# Patient Record
Sex: MALE | Race: WHITE | NOT HISPANIC OR LATINO | Employment: FULL TIME | URBAN - METROPOLITAN AREA
[De-identification: names, ages, dates, MRNs, and addresses within clinical notes are randomized per-mention and may not be internally consistent; named-entity substitution may affect disease eponyms.]

---

## 2017-03-08 ENCOUNTER — OFFICE VISIT - HEALTHEAST (OUTPATIENT)
Dept: FAMILY MEDICINE | Facility: CLINIC | Age: 47
End: 2017-03-08

## 2017-03-08 DIAGNOSIS — Z00.00 ROUTINE GENERAL MEDICAL EXAMINATION AT A HEALTH CARE FACILITY: ICD-10-CM

## 2017-03-08 DIAGNOSIS — H93.13 TINNITUS, BILATERAL: ICD-10-CM

## 2017-03-08 LAB
CHOLEST SERPL-MCNC: 205 MG/DL
FASTING STATUS PATIENT QL REPORTED: YES
HDLC SERPL-MCNC: 57 MG/DL
LDLC SERPL CALC-MCNC: 132 MG/DL
PSA SERPL-MCNC: 1.3 NG/ML (ref 0–2.5)
TRIGL SERPL-MCNC: 82 MG/DL

## 2017-03-08 ASSESSMENT — MIFFLIN-ST. JEOR: SCORE: 1828.12

## 2017-05-01 ENCOUNTER — OFFICE VISIT - HEALTHEAST (OUTPATIENT)
Dept: FAMILY MEDICINE | Facility: CLINIC | Age: 47
End: 2017-05-01

## 2017-05-01 DIAGNOSIS — G56.92 NEUROPATHY OF FINGER OF LEFT HAND: ICD-10-CM

## 2017-05-01 DIAGNOSIS — H91.93 HEARING DISORDER, BILATERAL: ICD-10-CM

## 2017-05-13 ENCOUNTER — COMMUNICATION - HEALTHEAST (OUTPATIENT)
Dept: FAMILY MEDICINE | Facility: CLINIC | Age: 47
End: 2017-05-13

## 2017-05-15 ENCOUNTER — AMBULATORY - HEALTHEAST (OUTPATIENT)
Dept: FAMILY MEDICINE | Facility: CLINIC | Age: 47
End: 2017-05-15

## 2017-05-15 DIAGNOSIS — N50.811 TESTICULAR PAIN, RIGHT: ICD-10-CM

## 2017-05-15 DIAGNOSIS — H93.13 TINNITUS, BILATERAL: ICD-10-CM

## 2017-05-24 ENCOUNTER — COMMUNICATION - HEALTHEAST (OUTPATIENT)
Dept: FAMILY MEDICINE | Facility: CLINIC | Age: 47
End: 2017-05-24

## 2017-05-24 ENCOUNTER — HOSPITAL ENCOUNTER (OUTPATIENT)
Dept: ULTRASOUND IMAGING | Facility: CLINIC | Age: 47
Discharge: HOME | End: 2017-05-24
Attending: FAMILY MEDICINE

## 2017-05-24 DIAGNOSIS — N50.811 TESTICULAR PAIN, RIGHT: ICD-10-CM

## 2017-06-13 ENCOUNTER — OFFICE VISIT - HEALTHEAST (OUTPATIENT)
Dept: AUDIOLOGY | Facility: CLINIC | Age: 47
End: 2017-06-13

## 2017-06-13 DIAGNOSIS — H93.13 TINNITUS, BILATERAL: ICD-10-CM

## 2017-08-08 ENCOUNTER — AMBULATORY - HEALTHEAST (OUTPATIENT)
Dept: FAMILY MEDICINE | Facility: CLINIC | Age: 47
End: 2017-08-08

## 2017-08-08 DIAGNOSIS — M25.561 ARTHRALGIA OF RIGHT KNEE: ICD-10-CM

## 2017-08-08 DIAGNOSIS — M77.11 LATERAL EPICONDYLITIS OF RIGHT ELBOW: ICD-10-CM

## 2017-08-09 ENCOUNTER — OFFICE VISIT - HEALTHEAST (OUTPATIENT)
Dept: PHYSICAL THERAPY | Facility: REHABILITATION | Age: 47
End: 2017-08-09

## 2017-08-09 DIAGNOSIS — M25.561 ACUTE PAIN OF RIGHT KNEE: ICD-10-CM

## 2017-08-09 DIAGNOSIS — M77.01 MEDIAL EPICONDYLITIS, RIGHT: ICD-10-CM

## 2017-08-14 ENCOUNTER — OFFICE VISIT - HEALTHEAST (OUTPATIENT)
Dept: PHYSICAL THERAPY | Facility: REHABILITATION | Age: 47
End: 2017-08-14

## 2017-08-14 DIAGNOSIS — M77.01 MEDIAL EPICONDYLITIS, RIGHT: ICD-10-CM

## 2017-08-14 DIAGNOSIS — M25.561 ACUTE PAIN OF RIGHT KNEE: ICD-10-CM

## 2017-08-21 ENCOUNTER — OFFICE VISIT - HEALTHEAST (OUTPATIENT)
Dept: PHYSICAL THERAPY | Facility: REHABILITATION | Age: 47
End: 2017-08-21

## 2017-08-21 DIAGNOSIS — M77.01 MEDIAL EPICONDYLITIS, RIGHT: ICD-10-CM

## 2017-08-21 DIAGNOSIS — M25.561 ACUTE PAIN OF RIGHT KNEE: ICD-10-CM

## 2017-08-24 ENCOUNTER — OFFICE VISIT - HEALTHEAST (OUTPATIENT)
Dept: PHYSICAL THERAPY | Facility: REHABILITATION | Age: 47
End: 2017-08-24

## 2017-08-24 DIAGNOSIS — M77.01 MEDIAL EPICONDYLITIS, RIGHT: ICD-10-CM

## 2017-08-24 DIAGNOSIS — M25.561 ACUTE PAIN OF RIGHT KNEE: ICD-10-CM

## 2017-08-29 ENCOUNTER — OFFICE VISIT - HEALTHEAST (OUTPATIENT)
Dept: PHYSICAL THERAPY | Facility: REHABILITATION | Age: 47
End: 2017-08-29

## 2017-08-29 DIAGNOSIS — M25.561 ACUTE PAIN OF RIGHT KNEE: ICD-10-CM

## 2017-08-29 DIAGNOSIS — M77.01 MEDIAL EPICONDYLITIS, RIGHT: ICD-10-CM

## 2017-08-31 ENCOUNTER — OFFICE VISIT - HEALTHEAST (OUTPATIENT)
Dept: PHYSICAL THERAPY | Facility: REHABILITATION | Age: 47
End: 2017-08-31

## 2017-08-31 DIAGNOSIS — M77.01 MEDIAL EPICONDYLITIS, RIGHT: ICD-10-CM

## 2017-08-31 DIAGNOSIS — M25.561 ACUTE PAIN OF RIGHT KNEE: ICD-10-CM

## 2017-09-05 ENCOUNTER — OFFICE VISIT - HEALTHEAST (OUTPATIENT)
Dept: PHYSICAL THERAPY | Facility: REHABILITATION | Age: 47
End: 2017-09-05

## 2017-09-05 DIAGNOSIS — M25.561 ACUTE PAIN OF RIGHT KNEE: ICD-10-CM

## 2017-09-05 DIAGNOSIS — M77.01 MEDIAL EPICONDYLITIS, RIGHT: ICD-10-CM

## 2017-09-22 ENCOUNTER — COMMUNICATION - HEALTHEAST (OUTPATIENT)
Dept: FAMILY MEDICINE | Facility: CLINIC | Age: 47
End: 2017-09-22

## 2017-09-22 ENCOUNTER — RECORDS - HEALTHEAST (OUTPATIENT)
Dept: ADMINISTRATIVE | Facility: OTHER | Age: 47
End: 2017-09-22

## 2017-09-22 DIAGNOSIS — T15.90XA FOREIGN BODY, EYE: ICD-10-CM

## 2017-09-27 ENCOUNTER — COMMUNICATION - HEALTHEAST (OUTPATIENT)
Dept: FAMILY MEDICINE | Facility: CLINIC | Age: 47
End: 2017-09-27

## 2017-09-28 ENCOUNTER — OFFICE VISIT - HEALTHEAST (OUTPATIENT)
Dept: PHYSICAL THERAPY | Facility: REHABILITATION | Age: 47
End: 2017-09-28

## 2017-09-28 ENCOUNTER — RECORDS - HEALTHEAST (OUTPATIENT)
Dept: ADMINISTRATIVE | Facility: OTHER | Age: 47
End: 2017-09-28

## 2017-09-28 DIAGNOSIS — M25.561 ACUTE PAIN OF RIGHT KNEE: ICD-10-CM

## 2017-09-28 DIAGNOSIS — M77.01 MEDIAL EPICONDYLITIS, RIGHT: ICD-10-CM

## 2017-09-29 ENCOUNTER — AMBULATORY - HEALTHEAST (OUTPATIENT)
Dept: NURSING | Facility: CLINIC | Age: 47
End: 2017-09-29

## 2017-10-23 ENCOUNTER — OFFICE VISIT - HEALTHEAST (OUTPATIENT)
Dept: FAMILY MEDICINE | Facility: CLINIC | Age: 47
End: 2017-10-23

## 2017-10-23 DIAGNOSIS — R00.2 HEART PALPITATIONS: ICD-10-CM

## 2017-10-23 LAB
ATRIAL RATE - MUSE: 67 BPM
DIASTOLIC BLOOD PRESSURE - MUSE: NORMAL MMHG
INTERPRETATION ECG - MUSE: NORMAL
P AXIS - MUSE: 69 DEGREES
PR INTERVAL - MUSE: 162 MS
QRS DURATION - MUSE: 84 MS
QT - MUSE: 354 MS
QTC - MUSE: 374 MS
R AXIS - MUSE: 75 DEGREES
SYSTOLIC BLOOD PRESSURE - MUSE: NORMAL MMHG
T AXIS - MUSE: 68 DEGREES
VENTRICULAR RATE- MUSE: 67 BPM

## 2017-10-24 ENCOUNTER — COMMUNICATION - HEALTHEAST (OUTPATIENT)
Dept: FAMILY MEDICINE | Facility: CLINIC | Age: 47
End: 2017-10-24

## 2017-10-24 DIAGNOSIS — Z80.0 FAMILY HISTORY OF COLON CANCER: ICD-10-CM

## 2017-11-07 ENCOUNTER — RECORDS - HEALTHEAST (OUTPATIENT)
Dept: ADMINISTRATIVE | Facility: OTHER | Age: 47
End: 2017-11-07

## 2017-12-16 ENCOUNTER — COMMUNICATION - HEALTHEAST (OUTPATIENT)
Dept: SCHEDULING | Facility: CLINIC | Age: 47
End: 2017-12-16

## 2018-02-12 ENCOUNTER — TELEPHONE (OUTPATIENT)
Dept: OTHER | Facility: CLINIC | Age: 48
End: 2018-02-12

## 2018-05-16 ENCOUNTER — OFFICE VISIT - HEALTHEAST (OUTPATIENT)
Dept: FAMILY MEDICINE | Facility: CLINIC | Age: 48
End: 2018-05-16

## 2018-05-16 DIAGNOSIS — Z00.00 ROUTINE GENERAL MEDICAL EXAMINATION AT A HEALTH CARE FACILITY: ICD-10-CM

## 2018-05-16 LAB
ALBUMIN SERPL-MCNC: 4 G/DL (ref 3.5–5)
ALP SERPL-CCNC: 55 U/L (ref 45–120)
ALT SERPL W P-5'-P-CCNC: 51 U/L (ref 0–45)
ANION GAP SERPL CALCULATED.3IONS-SCNC: 9 MMOL/L (ref 5–18)
AST SERPL W P-5'-P-CCNC: 34 U/L (ref 0–40)
BILIRUB SERPL-MCNC: 1.1 MG/DL (ref 0–1)
BUN SERPL-MCNC: 11 MG/DL (ref 8–22)
CALCIUM SERPL-MCNC: 9.3 MG/DL (ref 8.5–10.5)
CHLORIDE BLD-SCNC: 106 MMOL/L (ref 98–107)
CHOLEST SERPL-MCNC: 194 MG/DL
CO2 SERPL-SCNC: 25 MMOL/L (ref 22–31)
CREAT SERPL-MCNC: 0.91 MG/DL (ref 0.7–1.3)
FASTING STATUS PATIENT QL REPORTED: YES
GFR SERPL CREATININE-BSD FRML MDRD: >60 ML/MIN/1.73M2
GLUCOSE BLD-MCNC: 100 MG/DL (ref 70–125)
HDLC SERPL-MCNC: 53 MG/DL
LDLC SERPL CALC-MCNC: 128 MG/DL
POTASSIUM BLD-SCNC: 4.3 MMOL/L (ref 3.5–5)
PROT SERPL-MCNC: 7.5 G/DL (ref 6–8)
PSA SERPL-MCNC: 1.1 NG/ML (ref 0–2.5)
SODIUM SERPL-SCNC: 140 MMOL/L (ref 136–145)
TRIGL SERPL-MCNC: 64 MG/DL

## 2018-05-16 ASSESSMENT — MIFFLIN-ST. JEOR: SCORE: 1806.23

## 2018-05-17 ENCOUNTER — COMMUNICATION - HEALTHEAST (OUTPATIENT)
Dept: FAMILY MEDICINE | Facility: CLINIC | Age: 48
End: 2018-05-17

## 2018-05-21 ENCOUNTER — COMMUNICATION - HEALTHEAST (OUTPATIENT)
Dept: FAMILY MEDICINE | Facility: CLINIC | Age: 48
End: 2018-05-21

## 2018-05-22 ENCOUNTER — COMMUNICATION - HEALTHEAST (OUTPATIENT)
Dept: FAMILY MEDICINE | Facility: CLINIC | Age: 48
End: 2018-05-22

## 2019-02-18 ENCOUNTER — COMMUNICATION - HEALTHEAST (OUTPATIENT)
Dept: SCHEDULING | Facility: CLINIC | Age: 49
End: 2019-02-18

## 2019-02-18 DIAGNOSIS — H04.123 DRY EYES: ICD-10-CM

## 2019-02-20 ENCOUNTER — COMMUNICATION - HEALTHEAST (OUTPATIENT)
Dept: ADMINISTRATIVE | Facility: CLINIC | Age: 49
End: 2019-02-20

## 2019-02-20 DIAGNOSIS — H04.123 DRY EYES: ICD-10-CM

## 2019-03-27 ENCOUNTER — OFFICE VISIT - HEALTHEAST (OUTPATIENT)
Dept: FAMILY MEDICINE | Facility: CLINIC | Age: 49
End: 2019-03-27

## 2019-03-27 DIAGNOSIS — M67.471 GANGLION CYST OF RIGHT FOOT: ICD-10-CM

## 2019-03-27 DIAGNOSIS — R00.2 HEART PALPITATIONS: ICD-10-CM

## 2019-03-27 RX ORDER — CARBOXYMETHYLCELLULOSE SODIUM 5 MG/ML
SOLUTION/ DROPS OPHTHALMIC
Status: SHIPPED | COMMUNITY
Start: 2019-03-27

## 2019-03-29 ENCOUNTER — HOSPITAL ENCOUNTER (OUTPATIENT)
Dept: CARDIOLOGY | Facility: CLINIC | Age: 49
Discharge: HOME | End: 2019-03-29
Attending: FAMILY MEDICINE

## 2019-03-29 DIAGNOSIS — R00.2 HEART PALPITATIONS: ICD-10-CM

## 2019-04-01 ENCOUNTER — COMMUNICATION - HEALTHEAST (OUTPATIENT)
Dept: FAMILY MEDICINE | Facility: CLINIC | Age: 49
End: 2019-04-01

## 2019-04-08 ENCOUNTER — COMMUNICATION - HEALTHEAST (OUTPATIENT)
Dept: FAMILY MEDICINE | Facility: CLINIC | Age: 49
End: 2019-04-08

## 2019-06-03 ENCOUNTER — RECORDS - HEALTHEAST (OUTPATIENT)
Dept: LAB | Facility: CLINIC | Age: 49
End: 2019-06-03

## 2019-06-03 LAB
CHOLEST SERPL-MCNC: 217 MG/DL
FASTING STATUS PATIENT QL REPORTED: ABNORMAL
HDLC SERPL-MCNC: 63 MG/DL
LDLC SERPL CALC-MCNC: 140 MG/DL
TRIGL SERPL-MCNC: 69 MG/DL

## 2019-11-12 ENCOUNTER — AMBULATORY - HEALTHEAST (OUTPATIENT)
Dept: SCHEDULING | Facility: CLINIC | Age: 49
End: 2019-11-12

## 2019-11-12 DIAGNOSIS — E78.5 HYPERLIPIDEMIA LDL GOAL <100: ICD-10-CM

## 2019-12-03 ENCOUNTER — HOSPITAL ENCOUNTER (OUTPATIENT)
Dept: NUTRITION | Facility: CLINIC | Age: 49
Discharge: HOME | End: 2019-12-03

## 2019-12-03 DIAGNOSIS — E78.5 HYPERLIPIDEMIA LDL GOAL <100: ICD-10-CM

## 2020-08-03 ENCOUNTER — OFFICE VISIT - HEALTHEAST (OUTPATIENT)
Dept: FAMILY MEDICINE | Facility: CLINIC | Age: 50
End: 2020-08-03

## 2020-08-03 ENCOUNTER — COMMUNICATION - HEALTHEAST (OUTPATIENT)
Dept: FAMILY MEDICINE | Facility: CLINIC | Age: 50
End: 2020-08-03

## 2020-08-03 DIAGNOSIS — F41.1 GENERALIZED ANXIETY DISORDER: ICD-10-CM

## 2020-08-03 DIAGNOSIS — Z13.1 SCREENING FOR DIABETES MELLITUS: ICD-10-CM

## 2020-08-03 DIAGNOSIS — Z00.00 ROUTINE MEDICAL EXAM: ICD-10-CM

## 2020-08-03 DIAGNOSIS — E78.5 HYPERLIPIDEMIA, UNSPECIFIED HYPERLIPIDEMIA TYPE: ICD-10-CM

## 2020-08-03 LAB
ANION GAP SERPL CALCULATED.3IONS-SCNC: 10 MMOL/L (ref 5–18)
BUN SERPL-MCNC: 14 MG/DL (ref 8–22)
CALCIUM SERPL-MCNC: 9.8 MG/DL (ref 8.5–10.5)
CHLORIDE BLD-SCNC: 102 MMOL/L (ref 98–107)
CHOLEST SERPL-MCNC: 211 MG/DL
CO2 SERPL-SCNC: 26 MMOL/L (ref 22–31)
CREAT SERPL-MCNC: 0.99 MG/DL (ref 0.7–1.3)
FASTING STATUS PATIENT QL REPORTED: YES
GFR SERPL CREATININE-BSD FRML MDRD: >60 ML/MIN/1.73M2
GLUCOSE BLD-MCNC: 107 MG/DL (ref 70–125)
HDLC SERPL-MCNC: 58 MG/DL
LDLC SERPL CALC-MCNC: 136 MG/DL
POTASSIUM BLD-SCNC: 4.5 MMOL/L (ref 3.5–5)
SODIUM SERPL-SCNC: 138 MMOL/L (ref 136–145)
TRIGL SERPL-MCNC: 83 MG/DL

## 2020-08-03 ASSESSMENT — ANXIETY QUESTIONNAIRES
1. FEELING NERVOUS, ANXIOUS, OR ON EDGE: NOT AT ALL
5. BEING SO RESTLESS THAT IT IS HARD TO SIT STILL: NOT AT ALL
4. TROUBLE RELAXING: NOT AT ALL
IF YOU CHECKED OFF ANY PROBLEMS ON THIS QUESTIONNAIRE, HOW DIFFICULT HAVE THESE PROBLEMS MADE IT FOR YOU TO DO YOUR WORK, TAKE CARE OF THINGS AT HOME, OR GET ALONG WITH OTHER PEOPLE: NOT DIFFICULT AT ALL
2. NOT BEING ABLE TO STOP OR CONTROL WORRYING: NOT AT ALL
6. BECOMING EASILY ANNOYED OR IRRITABLE: NOT AT ALL
GAD7 TOTAL SCORE: 1
3. WORRYING TOO MUCH ABOUT DIFFERENT THINGS: NOT AT ALL
7. FEELING AFRAID AS IF SOMETHING AWFUL MIGHT HAPPEN: SEVERAL DAYS

## 2020-08-03 ASSESSMENT — MIFFLIN-ST. JEOR: SCORE: 1796.59

## 2020-08-03 ASSESSMENT — PATIENT HEALTH QUESTIONNAIRE - PHQ9: SUM OF ALL RESPONSES TO PHQ QUESTIONS 1-9: 1

## 2020-08-06 ENCOUNTER — COMMUNICATION - HEALTHEAST (OUTPATIENT)
Dept: ADMINISTRATIVE | Facility: CLINIC | Age: 50
End: 2020-08-06

## 2020-10-23 ENCOUNTER — OFFICE VISIT - HEALTHEAST (OUTPATIENT)
Dept: FAMILY MEDICINE | Facility: CLINIC | Age: 50
End: 2020-10-23

## 2020-10-23 DIAGNOSIS — T14.8XXA AVULSION FRACTURE: ICD-10-CM

## 2020-10-26 ENCOUNTER — HOSPITAL ENCOUNTER (OUTPATIENT)
Dept: NUTRITION | Facility: HOSPITAL | Age: 50
Discharge: HOME | End: 2020-10-26
Attending: FAMILY MEDICINE

## 2021-05-27 ASSESSMENT — PATIENT HEALTH QUESTIONNAIRE - PHQ9: SUM OF ALL RESPONSES TO PHQ QUESTIONS 1-9: 1

## 2021-05-27 NOTE — TELEPHONE ENCOUNTER
Spoke with pt, let him know Dr. Simpson's message below. Pt verbalized understanding and will send the monitor back in.   DOMINIC Campbell

## 2021-05-27 NOTE — TELEPHONE ENCOUNTER
If there is anxiety with wearing it,then he can take it back to the Cardiology where he got.He does not have to get to wear it for 4 more days if he does not want to.  Hopefully they will be able to get enough information from what he is done already.

## 2021-05-27 NOTE — TELEPHONE ENCOUNTER
I attempted to contact the patient and the phone does not ring and goes silent then hangs up.   I'm wondering if the patient contacted [facility] regarding the problem with the monitor? since they were placed the patient on the monitor and would be the ones to contact.   Maria R Brizuela, ERINN

## 2021-05-27 NOTE — TELEPHONE ENCOUNTER
Question following Office Visit  When did you see your provider: 3/26  What is your question: Patient state that he got the holter monitor that Joon Simpson MD ordered Friday afternoon. Patient stated that after 68 hours the device has stopped working. Patient stated that he contacted the company of the device and they stated that he will have to have them send a new device to him. Patient would like to know if the 68 hours is long enough or if he should request a replacement. Patient stated that he has been more anxious wearing the device and feels like he has been having more palpations while wearing it.   Okay to leave a detailed message: Yes

## 2021-05-27 NOTE — PROGRESS NOTES
ASSESSMENT:  1. Ganglion cyst of right foot  I counseled him that the cause of the eruption/swelling appears to be ganglion cyst.  At this point he does not appear to need any management since it is not painful.  He can observe it if there is an increase in the size and he is beginning to have pain then we can consider a referral to orthopedic doctor to remove it.    2. Heart palpitations  - DARYN Hook-Up; Future  Previously the palpitation has been evaluated using an ECG.  He has been having intermittent palpitations though not very concerning but he is worried about it.  Because of it we will go ahead and have him get a Holter monitor for 1 week to see if that will give us any abnormality.  He does have other multiple concerns or questions most of them were answered to the best of my ability.  One that is more important is that of his a sensation of choking whenever he tries to lay on his side.  For that we will look for a maxillofacial physician or surgeon to evaluate him.  He does not really have a sleep apnea, he did have mild GERD which he does not like to use and he did have a history of TMJ in the past.  All of these hopefully will help to find a specialist that he can discuss management with.    PLAN:  There are no Patient Instructions on file for this visit.    No orders of the defined types were placed in this encounter.    There are no discontinued medications.    No Follow-up on file.      CHIEF COMPLAINT:  Chief Complaint   Patient presents with     Foot Problem     R top of foot lump noticed it x 10 days     Sleeping Problem           HISTORY OF PRESENT ILLNESS:  Sb is a 48 y.o. male presenting to the clinic today with multiple questions and concerns.  He was in [place] for about a week and a half, noted that he wore a boot which he tightened very well.  Noted that on coming back to the [place] he started having some discomfort and noticed a small nodule/mass on top of his right forefoot.  The  pain had lessened considerably, but he still have the lump and he is worried about it not knowing what caused the lump.  He went to discuss that.  He has also been having these sleeping issue which had been evaluated in the past he will have episodes whereby he would like to sleep on his back but prior to sleeping he will feel that his throat is closing up and I will give him the sense of impending doom.  This will make him not to sleep.  He has been seen by sleep physicians, dentist, but none of them has been able to give him the right answer.  He does have a mouthguard that he was advised to use but ended up not using it because it hurts his jaw.  Is wondering what else to do at this time though it is not bothering him because he sleeps on his side but he is wondering if he had to have a situation that he mostly on his back what he will need to do.  He has also been having palpitations which was evaluated in the past.  He had an EKG that was negative.  He continues to have palpitation intermittently and is wondering what next he can do to figure out exactly what is causing that.  He is also feeling that he is overly tired and achy.  He feels that his joints are stiff even though he exercises a lot.  Is wondering if there is anything that he can do to lessen that.  REVIEW OF SYSTEMS:   He does feel well otherwise and review of system was fully done and is as in the history otherwise all other systems are negative.    PFSH:  Reviewed, as below.    Social History     Tobacco Use   Smoking Status Never Smoker   Smokeless Tobacco Never Used       Family History   Problem Relation Age of Onset     Breast cancer Mother      Colon cancer Father      Heart disease Father      Diabetes Father      Snoring Father      No Medical Problems Brother        Social History     Socioeconomic History     Marital status:      Spouse name: Not on file     Number of children: Not on file     Years of education: Not on file      Highest education level: Not on file   Occupational History     Not on file   Social Needs     Financial resource strain: Not on file     Food insecurity:     Worry: Not on file     Inability: Not on file     Transportation needs:     Medical: Not on file     Non-medical: Not on file   Tobacco Use     Smoking status: Never Smoker     Smokeless tobacco: Never Used   Substance and Sexual Activity     Alcohol use: Yes     Alcohol/week: 0.6 oz     Types: 1 Shots of liquor per week     Drug use: Not on file     Sexual activity: Yes     Partners: Female   Lifestyle     Physical activity:     Days per week: Not on file     Minutes per session: Not on file     Stress: Not on file   Relationships     Social connections:     Talks on phone: Not on file     Gets together: Not on file     Attends Gnosticist service: Not on file     Active member of club or organization: Not on file     Attends meetings of clubs or organizations: Not on file     Relationship status: Not on file     Intimate partner violence:     Fear of current or ex partner: Not on file     Emotionally abused: Not on file     Physically abused: Not on file     Forced sexual activity: Not on file   Other Topics Concern     Not on file   Social History Narrative     Not on file       No past surgical history on file.    No Known Allergies    Active Ambulatory Problems     Diagnosis Date Noted     Dysphagia      Neck Pain      Vitamin D Deficiency      Hyperlipidemia      Nonvenomous Insect Bite Of Leg      TMJ Pain      Routine general medical examination at a health care facility 01/20/2015     Resolved Ambulatory Problems     Diagnosis Date Noted     No Resolved Ambulatory Problems     Past Medical History:   Diagnosis Date     Anxiety        Current Outpatient Medications   Medication Sig Dispense Refill     carboxymethylcellulose (REFRESH PLUS) 0.5 % Dpet ophthalmic dropperette        cholecalciferol, vitamin D3, 1,000 unit capsule Take 1,000 Units by mouth  daily.       No current facility-administered medications for this visit.        VITALS:  Vitals:    03/27/19 0940   BP: 118/66   Pulse: 80   Resp: 20   Weight: 191 lb 1 oz (86.7 kg)     Wt Readings from Last 3 Encounters:   03/27/19 191 lb 1 oz (86.7 kg)   05/16/18 184 lb 14.4 oz (83.9 kg)   10/23/17 184 lb 14.4 oz (83.9 kg)     Body mass index is 23.41 kg/m .    PHYSICAL EXAM:  General Appearance: Alert, cooperative, no distress, appears stated age  HEENT: Pupils are equal and reactive, extraocular motions is normal. Neck is supple no notable thyromegaly.  External ears are normal.  Lungs: Respiration is unlabored  Heart: Normal and regular peripheral pulsation.    Abdomen: Soft  Musculoskeletal: Normal range of motion. No joint swelling or deformity. On his left dorsal feet he does have a small dime sized lump that is consistent with a lipoma.  Neurologic:  Alert and oriented times 3.   Psychiatric: Normal mood and affect.    MEDICATIONS:  Current Outpatient Medications   Medication Sig Dispense Refill     carboxymethylcellulose (REFRESH PLUS) 0.5 % Dpet ophthalmic dropperette        cholecalciferol, vitamin D3, 1,000 unit capsule Take 1,000 Units by mouth daily.       No current facility-administered medications for this visit.

## 2021-05-27 NOTE — TELEPHONE ENCOUNTER
Patient Returning Call  Reason for call:  Returning phone call  Information relayed to patient:  Below message relayed to patient.  Patient has additional questions:  Yes  If YES, what are your questions/concerns:  Patient states he is aware what will need to be done due to the Holter Monitor being faulty. Patient is questioning is it necessary to continue to wear it four more days. Patient states he has been wearing the monitor for more than sixty plus hours, and feels this is a sufficient amount of time. Please advise, and reach out to the patient.  Okay to leave a detailed message?: Yes

## 2021-05-28 ASSESSMENT — ANXIETY QUESTIONNAIRES: GAD7 TOTAL SCORE: 1

## 2021-05-30 VITALS — BODY MASS INDEX: 23.29 KG/M2 | WEIGHT: 188.8 LBS

## 2021-05-30 VITALS — HEIGHT: 76 IN | WEIGHT: 190.6 LBS | BODY MASS INDEX: 23.21 KG/M2

## 2021-05-31 VITALS — BODY MASS INDEX: 22.81 KG/M2 | WEIGHT: 184.9 LBS

## 2021-06-01 VITALS — BODY MASS INDEX: 22.52 KG/M2 | WEIGHT: 184.9 LBS | HEIGHT: 76 IN

## 2021-06-02 VITALS — WEIGHT: 191.06 LBS | BODY MASS INDEX: 23.41 KG/M2

## 2021-06-03 NOTE — PROGRESS NOTES
"Nutrition Assessment    Patient seen for outpatient MNT initial assessment.    Referring diagnosis: Hyperlipidemia LDL goal <100  Family hx of heart disease (stents when ~70 yrs old)    Height: 6' 3.75\"  Weight: 185 lb  BMI:22.7  BMI indication: 18.5-24.9 normal weight     Labs: 6/3/19 217 H, Triglycerides 69, HDL Cholesterol 63, LDL Cholesterol 140 H    Assessment of diet/weight history: Rod eats a very sensible, healthy diet.  Mostly home cooked foods.  He eats 3 meals daily with 2 snacks.  His wife is a very good cook and they have a variety of healthy meals, including meatless meals.  He eats fruits, vegetables and whole grains daily.  Has an occasional baked good.  Snacks are usually fruit and/ or nuts. He drinks one small frida weekly.  Daily beverages - ~ 4 glasses of water and one cup of green tea, an occasional kombucha.    Assessment of physical activity: 5 times weekly, alternating aerobic with strength training, also walks with his wife.  Enjoys outdoor activies - skiing, cycling    Nutrition intervention that addressed medical nutrition therapy for above diagnosis: Lipid lowering strategies. Discussed < 20 grams of saturated fat, 200mg of cholesterol, with soluble fiber foods, and plant stanol/sterols.    RD reviewed label reading and physical activity, snacks, lean cuts of meat, cereals, adequate hydration, importance of plant based diet with some meats, fish, poultry, dairy    Education materials provided: Hypercholesterolemia Nutrition Therapy, Low fat cheeses    Goals: Lower LDL:  1. Switch to  more oats at breakfast less life cereal  2. Reduce saturated fats - cheese, butter  Along with diet, heart rate in aerobic zone for age when doing aerobic activity  Hydration:  1. Increase fluids to 10-11 cups daily - urine light yellow to clear    Patient had no barriers to learning, verbalized understanding, and compliance is expected.    Phone number provided for questions. Recommended follow-up in as " needed.    Thank you for this consult. Call me at [phone] for questions.  Face to face time: 60 minutes

## 2021-06-04 VITALS
SYSTOLIC BLOOD PRESSURE: 112 MMHG | HEIGHT: 76 IN | DIASTOLIC BLOOD PRESSURE: 70 MMHG | BODY MASS INDEX: 22.15 KG/M2 | HEART RATE: 56 BPM | WEIGHT: 181.9 LBS | OXYGEN SATURATION: 100 %

## 2021-06-05 VITALS
SYSTOLIC BLOOD PRESSURE: 112 MMHG | OXYGEN SATURATION: 100 % | WEIGHT: 187.5 LBS | BODY MASS INDEX: 22.82 KG/M2 | DIASTOLIC BLOOD PRESSURE: 78 MMHG | HEART RATE: 67 BPM

## 2021-06-09 NOTE — PROGRESS NOTES
ASSESSMENT:  1. Routine general medical examination at a health care facility  - Lipid Cascade  - Comprehensive Metabolic Panel  - Thyroid Cascade  - PSA (Prostatic-Specific Antigen), Annual Screen    2. Tinnitus, bilateral    PLAN:  There are no Patient Instructions on file for this visit.    Orders Placed This Encounter   Procedures     Lipid Cascade     Order Specific Question:   Fasting is required?     Answer:   Yes     Comprehensive Metabolic Panel     Thyroid Cascade     PSA (Prostatic-Specific Antigen), Annual Screen     There are no discontinued medications.    No Follow-up on file.     Counseled with regard to exercise, diet, cancer screening, and cardiovascular health. Encouraged patient to check his testicles for changes and abnormalities. Patient has a family history of colon cancer in his father that was diagnosed at around age 70;OLDER BROTHER AT 53 had a normal Colonoscopy encouraged patient to have a colonoscopy at age 45. Discussed screening methods of prostate issues; a PSA lab was ordered. Will get labs as part of a routine physical exam.     CHIEF COMPLAINT:  Chief Complaint   Patient presents with     Annual Exam     Fasting, ringing in ears       HISTORY OF PRESENT ILLNESS:  Sb is a 46 y.o. male presenting to the clinic today for an annual exam and for evaluation of ear ringing. His last physical exam was last year on 2/16/2016. He is fasting today. He does exercise.     Ear Ringing: He has noticed more ringing in his ears bilaterally, and it is not overly bothersome. The ear ringing is constant, but it varies in intensity. He notices the ear ringing more when he comes home from loud social events. He does not listen to loud music. The ear ringing does not bother him when he sleeps. He has occasional headaches, but headaches are not associated with the ear ringing. He denies dizziness and hearing difficulty.     REVIEW OF SYSTEMS:   He saw Sleep Medicine on 4/12/2016 because he noticed  some throat closure with sleeping, and he was given a dental device that he does not use often; he does not notice much change since seeing Sleep Medicine, but the sleeping issue is not overly bothersome.   He does not take cholecalciferol consistently.   He denies heartburn. He has what feels like an epigastric muscle spasm at times, but not recently. He has left knee pain occasionally. He occasionally has a tiny pain in his right testicle that is not associated with sex or urinating; he notices it if his testicle is pinched; the pain has not worsened. He has been seen for testicle pain in the past. All other systems are negative.    PFSH:  Reviewed, as below.    History   Smoking Status     Never Smoker   Smokeless Tobacco     Never Used       Family History   Problem Relation Age of Onset     Breast cancer Mother      Colon cancer Father      Heart disease Father      Diabetes Father      Snoring Father      No Medical Problems Brother        Social History     Social History     Marital status:      Spouse name: N/A     Number of children: N/A     Years of education: N/A     Occupational History     Not on file.     Social History Main Topics     Smoking status: Never Smoker     Smokeless tobacco: Never Used     Alcohol use 0.6 oz/week     1 Shots of liquor per week     Drug use: Not on file     Sexual activity: Yes     Partners: Female     Other Topics Concern     Not on file     Social History Narrative     Surgical: No pertinent past surgical history.     No Known Allergies    Active Ambulatory Problems     Diagnosis Date Noted     Dysphagia      Neck Pain      Vitamin D Deficiency      Hyperlipidemia      Nonvenomous Insect Bite Of Leg      TMJ Pain      Routine general medical examination at a health care facility 01/20/2015     Resolved Ambulatory Problems     Diagnosis Date Noted     No Resolved Ambulatory Problems     Past Medical History:   Diagnosis Date     Anxiety        Current Outpatient  "Prescriptions   Medication Sig Dispense Refill     cholecalciferol, vitamin D3, 1,000 unit capsule Take 1,000 Units by mouth daily.       No current facility-administered medications for this visit.        VITALS:  Vitals:    03/08/17 0910   BP: 108/70   Pulse: 64   Weight: 190 lb 9.6 oz (86.5 kg)   Height: 6' 3.5\" (1.918 m)     Wt Readings from Last 3 Encounters:   03/08/17 190 lb 9.6 oz (86.5 kg)   04/12/16 193 lb 4.8 oz (87.7 kg)   02/16/16 195 lb 14.4 oz (88.9 kg)     Body mass index is 23.51 kg/(m^2).    PHYSICAL EXAM:  General Appearance: Alert, cooperative, no distress, appears stated age  Head: Normocephalic, without obvious abnormality, atraumatic  Eyes: Pupils equal, symmetric  Ears: Normal TMs and external ear canals, both ears  Nose: Nares normal, septum midline, mucosa normal, no drainage  Throat: Lips, mucosa, and tongue normal; teeth and gums normal  Neck: Supple, symmetrical, trachea midline, no adenopathy;  thyroid: not enlarged, symmetric, no tenderness/mass/nodules  Lungs: Clear to auscultation bilaterally, respirations unlabored  Heart: Regular rate and rhythm, S1 and S2 normal, no murmur, rub, or gallop  Abdomen: Soft, non-tender, bowel sounds active all four quadrants, no masses, no organomegaly  Genitourinary: Slight tenderness on the right testicle with some pressure, otherwise normal.  Musculoskeletal: Normal range of motion. No joint swelling or deformity.   Extremities normal, atraumatic, no cyanosis or edema  Skin: Back: 2 seborrhea noted on the left side, 1 skin tag noted.   Multiple other benign-looking skin lesions on arms and back.   Lymph nodes: Cervical nodes normal  Neurologic:  Alert and oriented times 3. Normal reflexes. Cranial nerves II-XII intact.   Psychiatric: Normal mood and affect.      ADDITIONAL HISTORY SUMMARIZED (2): Reviewed 2/16/2016 note regarding last physical exam. Reviewed 4/12/2016 note from Sleep Medicine regarding sleep disturbance.   DECISION TO OBTAIN EXTRA " INFORMATION (1): None.   RADIOLOGY TESTS (1): None.  LABS (1): Ordered labs.   MEDICINE TESTS (1): None.  INDEPENDENT REVIEW (2 each): None.       The visit lasted a total of 35 minutes face to face with the patient. Over 50% of the time was spent counseling and educating the patient about health maintenance and overall health.    Scott CROUCH, am scribing for and in the presence of, Dr. Simpson.    I, Dr. Simpson, personally performed the services described in this documentation, as scribed by Scott Kumari in my presence, and it is both accurate and complete.    MEDICATIONS:  Current Outpatient Prescriptions   Medication Sig Dispense Refill     cholecalciferol, vitamin D3, 1,000 unit capsule Take 1,000 Units by mouth daily.       No current facility-administered medications for this visit.        Total data points: 3

## 2021-06-10 NOTE — PROGRESS NOTES
ASSESSMENT:  1. Hearing disorder, bilateral  - Ambulatory referral to Audiology  - Ambulatory referral to ENT    2. Neuropathy of finger of left hand    PLAN:  There are no Patient Instructions on file for this visit.    Orders Placed This Encounter   Procedures     Ambulatory referral to Audiology     Referral Priority:   Routine     Referral Type:   Audiology     Referral Reason:   Evaluation and Treatment     Requested Specialty:   Audiology     Number of Visits Requested:   1     Ambulatory referral to ENT     Referral Priority:   Routine     Referral Type:   Consultation     Referral Reason:   Evaluation and Treatment     Requested Specialty:   Otolaryngology     Number of Visits Requested:   1     There are no discontinued medications.    No Follow-up on file.     Referred patient to Audiology and ENT for evaluation and management of hearing disorder. With regard to hand problem, encouraged patient to modify his exercises to try other exercises instead of push ups.  He does not have an obvious carpal tunnel syndrome but I did advise advised him to wear a left wrist splint at night to see if that will be helpful for him.  Will inform me in about 1-2 weeks how the fingers are doing.  He also wants to leave make an appointment for ENT for about a week or to see if the ring and vibration will lessen.  CHIEF COMPLAINT:  Chief Complaint   Patient presents with     Hearing Problem     vibrating noise with low sounds and high sounds, has had ringing in ears for years     Hand Problem     tingling in 4th and 5th fingers for the last year       HISTORY OF PRESENT ILLNESS:  Sb is a 46 y.o. male presenting to the clinic today for evaluation of a hearing problem and hand problem.     Hearing Problem: He has had bilateral ear ringing for years. Over the last 2 or 3 weeks, he has felt a fullness in his ears and has been more sensitive to sounds. he has been hearing a reverberating sound when he listens to low and high  pitches such as garbage truck noises and microwave beeping. He denies dizziness, headaches, ear pain, and hearing difficulty. He does not have a history of seasonal allergies.     Hand Problem: He has had tingling in his left ring and pinky fingers on-and-off for over a year. He feels an odd sensation in his left elbow as well. His left elbow cracks if he does push ups. The tingling is not worse during any time of day. The sensation in his hands are normal. He denies wrist and elbow pain.     REVIEW OF SYSTEMS:   He has had mild cold symptoms since last night including slight runny nose. All other systems are negative.    PFSH:  Reviewed, as below.     TOBACCO USE:  History   Smoking Status     Never Smoker   Smokeless Tobacco     Never Used       VITALS:  Vitals:    05/01/17 1143   BP: 124/70   Pulse: 76   Weight: 188 lb 12.8 oz (85.6 kg)     Wt Readings from Last 3 Encounters:   05/01/17 188 lb 12.8 oz (85.6 kg)   03/08/17 190 lb 9.6 oz (86.5 kg)   04/12/16 193 lb 4.8 oz (87.7 kg)     Body mass index is 23.29 kg/(m^2).    PHYSICAL EXAM:  General Appearance: Alert, cooperative, no distress, appears stated age  Head: Normocephalic, without obvious abnormality, atraumatic  Eyes: Pupils equal, symmetric  Ears: Normal TMs and external ear canals, both ears. Mild cerumen bilaterally, non-occluding. Cerumen removed in clinic.   Nose: Nares normal, septum midline, mucosa normal  Throat: Postnasal drip noted.   Neck: Supple, symmetrical, trachea midline, no adenopathy;  thyroid: not enlarged, symmetric, no tenderness/mass/nodules  Lungs: Clear to auscultation bilaterally, respirations unlabored  Heart: Regular rate and rhythm, S1 and S2 normal, no murmur, rub, or gallop  Lymph nodes: Cervical nodes normal  Neurologic:  Alert and oriented times 3. Normal reflexes. Cranial nerves II-XII intact.   Psychiatric: Normal mood and affect.      ADDITIONAL HISTORY SUMMARIZED (2): None.  DECISION TO OBTAIN EXTRA INFORMATION (1):  None.   RADIOLOGY TESTS (1): None.  LABS (1): None.  MEDICINE TESTS (1): None.  INDEPENDENT REVIEW (2 each): None.       The visit lasted a total of 23 minutes face to face with the patient. Over 50% of the time was spent counseling and educating the patient about hearing problem and hand problem.    IScott, am scribing for and in the presence of, Dr. Simpson.    I, Dr. Simpson, personally performed the services described in this documentation, as scribed by Scott Kumari in my presence, and it is both accurate and complete.    MEDICATIONS:  Current Outpatient Prescriptions   Medication Sig Dispense Refill     cholecalciferol, vitamin D3, 1,000 unit capsule Take 1,000 Units by mouth daily.       No current facility-administered medications for this visit.        Total data points: 0

## 2021-06-11 NOTE — PROGRESS NOTES
Audiology only; referred by Joon Simpson    History:  Bilateral constant tinnitus for the past three months; this is often elevated after noise exposure. He denied pulsatile quality to tinnitus, hearing loss, recent vertigo, otalgia, or recent illness; he did endorse bilateral aural fullness. He noted significant improvement in hearing and aural fullness symptoms following cerumen removal by Dr. Simpson; he wondered if more cerumen has built up. Patient reported history of TMD pain.    Results:  Otoscopy was unremarkable in either ear; normal amounts of non-occluding cerumen were visible, bilaterally. Hearing sensitivity was assessed with good reliability using circumaural phones.      Normal hearing sensitivity, bilaterally, for 250-8000 Hz; highest threshold was at 5 dB.    Speech reception thresholds showed agreement with pure tone findings in each ear. Word recognition ability was excellent, bilaterally, with presentation levels below typical conversational volume in both ears.    Tympanometry was consistent with normal middle ear function, bilaterally.    Recommendations:  Follow-up with PCP; retest hearing annually (to monitor) or per medical management.  Wear hearing protection consistently in noise. Common tinnitus triggers and management strategies were discussed at length.  Encouraged discontinuation of cotton swab use in ears to prevent potential injury and impacted cerumen; discussed cerumen as a protective/cleaning agent in ears which should only be wiped away from the canal opening. What remains in the canal still has beneficial attributes, and should be left alone. Mr. Shultz expressed verbal understanding of this information.    Juno Amor., [facility]  [place] Licensed Audiologist [id number]

## 2021-06-12 NOTE — PROGRESS NOTES
"[facility] Daily Progress     Patient Name: Sb Clementex  Date: 2017  Visit #: 4  PTA # 1  Referral Diagnosis: Arthralgia of right knee; right medial epicondylitis  Referring provider: Joon Simpson*  Visit Diagnosis:   No diagnosis found.      Assessment:     Patient is benefitting from skilled physical therapy and is making steady progress toward functional goals.  Patient is appropriate to continue with skilled physical therapy intervention, as indicated by initial plan of care.  Initial goals remain appropriate.  Pt demonstrates weak hip musculature. Pt was only able to do 5 reps of SL hip AB on the R and 6 on the L.    Goal Status: On going  Pt. will be independent with home exercise program in : 6 weeks  Pt will: Able to return to hiking for 1 hour within 4-6 weeks  Pt will: Able to step through up and down stairs with normal pace within 4-6 weeks  Pt will: Able to stand from sitting with pain 0-1/10 within 4-6 weeks  Pt will: Able to walk on even surfaces, even carrying objects >15#, with normal pattern and pace within 4-6 weeks    Plan / Patient Education:     Review stretches and strengthening prn  Re assess US  Progress core and hip strengthening    Subjective:     Pain Ratin currently     \" I think it is getting slowly better.\" No pain when sitting.   Pt has been walking. Walked to and from the gym today.  Pt will be going hiking in 3 hiking. Pt bought hiking poles.  Pt would like to fly fish.  Pt reports he has been doing his HEP.   Pt felt the ultrasound was healing.      Objective:     Exercises:  Exercise #1: Reviewed current strentches: SKC, LTR, piriformis, supine hamstring  Exercise #2: Added: sitting hamstring stretche  Comment #2: 30 seconds X 1-3 reps  Exercise #3: quad set  Comment #3: 10 seconds x 10 reps  Exercise #4: SLR with quad set  Comment #4: work up to 20   Wall slide  With ball behind back  X 20 reps    Treatment Today     TREATMENT MINUTES COMMENTS "   Evaluation     Self-care/ Home management     Manual therapy     Neuromuscular Re-education     Therapeutic Activity     Therapeutic Exercises 15 See above or flow sheet;  Added to HEP: SL clamshell B, SL hip AB B   Gait training     Modality__________________     ultrasound 10 Pt supine with bolster under knees;  100%, .9w/cm2, 1 MHz, right medial knee         Total 25    Blank areas are intentional and mean the treatment did not include these items.       Josette Melton, PTA,CLT  8/24/2017

## 2021-06-12 NOTE — PROGRESS NOTES
"OP MNT Nutrition Assessment & Education    Sb Duncan is a 49 y.o. male who is being evaluated via a billable telephone visit.      The patient has been notified of following:     \"This telephone visit will be conducted via a call between you and your physician/provider. We have found that certain health care needs can be provided without the need for a physical exam.  This service lets us provide the care you need with a short phone conversation.    Telephone visits are billed at different rates depending on your insurance coverage. During this emergency period, for some insurers they may be billed the same as an in-person visit.  Please reach out to your insurance provider with any questions.    If during the course of the call the physician/provider feels a telephone visit is not appropriate, you will not be charged for this service.\"    Patient has given verbal consent to a Telephone visit? Yes    Phone call duration:  60  minutes  Ann-Marie Biggs RD     Patient seen for outpatient MNT initial assessment.    Referring diagnosis: Hyperlipidemia, unspecified hyperlipidemia type    Height: 6' 4\"   Weight: 181 to 187 lbs  BMI: 22.5  BMI indication: 18.5-24.9 normal weight  IBW: 202  Lbs  Wt Readings from Last 3 Encounters:   10/23/20 187 lb 8 oz (85 kg)   08/03/20 181 lb 14.4 oz (82.5 kg)   03/27/19 191 lb 1 oz (86.7 kg)     Labs:   Results for CHRISSY DUNCAN (MRN [id number])    Ref. Range 8/3/2020 08:34   Cholesterol Latest Ref Range: <=199 mg/dL 211 (H)   Triglycerides Latest Ref Range: <=149 mg/dL 83   HDL Cholesterol Latest Ref Range: >=40 mg/dL 58   LDL Calculated Latest Ref Range: <=129 mg/dL 136 (H)     Past Vit D level low at 29.6 (Feb 2016)  RD recommends 5,000 IU Vitamin D daily (125 mcg)    Nutrition intervention that addressed medical nutrition therapy for above diagnosis:  Patient's diet is healthy and patient is lean at 92 % IBW. RD reviewed cardiovascular risk factors.  Smoking, Oxidative stress, aging, " diabetes.    Labs: Total Chol > 200 but HDL & LDL percentages are good.     Goals: My Numbers   Total    < 200       211   20 % HDL         40+    58 ( 27%)   65%  LDL     <130  136 ( 65%)     RD reviewed portfolio diet:   [email or web address].ca/[email or web address]%20Portfolio%20Diet.df       2 grams plant sterols (see handout)    50 g Nuts    50 g soy protein    10-25 grams soluble fiber (see handout)    Diet recall:     Breakfast:  Oats with blueberries, walnuts, almond butter, oat Cr    Lunch:   Potato-kale soup, bread, apple    Dinner:   Pasta, tofu, chicken, lentils,     Snack:   Cashews, walnuts, dried fruit  (see list for snack  ideas)    Education materials provided:     Good fat / bad fat list    Fiber listing    100 calorie snack list    1800 calorie meal plan    Calorie controlled B-CL-D ideas    Carb list with Portion sizes    Ways to add fruits & vegetables    Info on Vitamin D      Patient had no barriers to learning, verbalized understanding, and compliance is expected.    Phone number provided for questions. Recommended follow-up in as needed.    Thank you for this consult. Call me at [phone] for questions    Start time: 1:00  Stop time: 2:00

## 2021-06-12 NOTE — PROGRESS NOTES
ASSESSMENT:  1. Avulsion fracture    X-ray reviewed and shows a small avulsion fracture off of the dorsal aspect of the distal phalanx on that toe.  I reassured him that there is really not much more we would do at this point.  That he can camilo tape it for comfort but I reassured him that this is probably why it is taking a bit longer to heal and he is comfortable with that description.  He will let me know if he is having any further problems.      - XR Toe Right 2 or More VWS          PLAN:  There are no Patient Instructions on file for this visit.    Orders Placed This Encounter   Procedures     XR Toe Right 2 or More VWS     Order Specific Question:   Specify Toe     Answer:   4th Toe     Order Specific Question:   Reason for Exam (Describe Symptoms):     Answer:   trauma to toe 3 weeks ago, still painful/swollen     Order Specific Question:   Can the procedure be changed per Radiologist protocol?     Answer:   Yes     There are no discontinued medications.    No follow-ups on file.    CHIEF COMPLAINT:  Chief Complaint   Patient presents with     Toe pain     Rt foot,  4th toe pain - Hit toe on something while walking and now, was quite bruised at first, now bruising is gone but still painful.        HISTORY OF PRESENT ILLNESS:  Sb is a 49 y.o. male presenting to the clinic today with toe pain.  He states that about 3 weeks ago he was walking with slippers on and hit his fourth toe on the right foot and believes it went underneath him as he hit it.  He had a lot of pain right away but he used ice and some ibuprofen and it seemed to get a little bit better.  He had minimal swelling at the toe and some bruising noted.  He used some ibuprofen occasionally.  He is concerned though, that it has not gotten better as its been 3 weeks now and it continues to be a bit swollen and sore and tender especially when he does something on his tiptoes or when he walks or goes upstairs.  He has not been using any medications  on and has not been camilo taping it.    REVIEW OF SYSTEMS:     All other systems are negative.    PFSH:    Reviewed      TOBACCO USE:  Social History     Tobacco Use   Smoking Status Never Smoker   Smokeless Tobacco Never Used       VITALS:  Vitals:    10/23/20 0854   BP: 112/78   Patient Site: Left Arm   Patient Position: Sitting   Cuff Size: Adult Regular   Pulse: 67   SpO2: 100%   Weight: 187 lb 8 oz (85 kg)     Wt Readings from Last 3 Encounters:   10/23/20 187 lb 8 oz (85 kg)   08/03/20 181 lb 14.4 oz (82.5 kg)   03/27/19 191 lb 1 oz (86.7 kg)     Body mass index is 22.82 kg/m .    PHYSICAL EXAM:  Constitutional:  Well appearing patient in no acute distress.  Vitals:  Per nursing notes.    HEENT:  Normocephalic, atraumatic.  Ears are clear bilaterally, with no fluid or redness, and landmarks visible.  Pupils are equal and reactive to light, extraocular muscles intact, visual fields are full.  Nose is normal, and oropharynx is clear without redness.    Neck is without lymphadenopathy.    Lungs:  Clear to auscultation bilaterally without wheezes, rales or rhonchi.   Cardiac:  Regular rate and rhythm without murmurs, rubs, or gallops.     The fourth toe on that right foot does show some tenderness at the DIP joint and some minimal swelling but no bruising or redness or warmth noted.    X-ray shows a small avulsion fracture off the tip of the DIP.    Xr Toe Right 2 Or More Vws    Result Date: 10/23/2020  EXAM: XR TOE RIGHT 2 OR MORE VWS LOCATION: [facility] DATE/TIME: 10/23/2020 9:14 AM INDICATION: trauma to toe 3 weeks ago, still painful/swollen COMPARISON: None.     There is a tiny nondisplaced and minimally distracted fracture along the dorsal aspect of the distal phalanx of the toe with associated soft tissue swelling. This is at the DIP joint.     MEDICATIONS:  Current Outpatient Medications   Medication Sig Dispense Refill     carboxymethylcellulose (REFRESH PLUS) 0.5 % Dpet ophthalmic  dropperette        cholecalciferol, vitamin D3, 1,000 unit capsule Take 1,000 Units by mouth daily.       fluticasone propionate (FLONASE) 50 mcg/actuation nasal spray SPRAY 2 SPRAYS INTO EACH NOSTRIL EVERY DAY AS DIRECTED       No current facility-administered medications for this visit.

## 2021-06-12 NOTE — PROGRESS NOTES
[facility] Daily Progress     Patient Name: Sb Shultz  Date: 2017  Visit #: 7  PTA # 2  Referral Diagnosis: Arthralgia of right knee; right medial epicondylitis  Referring provider: Joon Simpson*  Visit Diagnosis:     ICD-10-CM    1. Acute pain of right knee M25.561    2. Medial epicondylitis, right M77.01          Assessment:     Patient is benefitting from skilled physical therapy and is making steady progress toward functional goals.  Patient is appropriate to continue with skilled physical therapy intervention, as indicated by initial plan of care.  Initial goals remain appropriate.    Pt has achieved many of his goals and is getting close to discharging.  He will try a hike again today to challenge himself but overall feels the progress has been great.  Discussed if he is working out a muscle group at his gym that he also work out the opposite muscle group and he understood.      Goal Status: Progressing towards  Pt. will be independent with home exercise program in : 6 weeks  Pt will: Able to return to hiking for 1 hour within 4-6 weeks  Pt will: Able to step through up and down stairs with normal pace within 4-6 weeks  Pt will: Able to stand from sitting with pain 0-1/10 within 4-6 weeks  Pt will: Able to walk on even surfaces, even carrying objects >15#, with normal pattern and pace within 4-6 weeks     Up and down stairs with no pain-goal met  Hiking-he feels he can do. He will do this today as well  Able to do twisting motions like with golf    Plan / Patient Education:     Check on how hike went  Discharge within 1-3 visits    Subjective:     Pain Ratin currently     I now feel 75-80%  The exercises last time really helped stretch out the correct muscles    Objective:     Exercises:  Exercise #1: Reviewed current strentches: SKC, LTR, piriformis, supine hamstring  Exercise #2: HS strethc  Comment #2: 30 seconds X 1-3 reps  Exercise #3: quad set  Comment #3: 10 seconds x  "10 reps  Exercise #4: SLR with quad set  Comment #4: work up to 20  Exercise #5: SL clamshell B  Comment #5: x10  Exercise #6: SL hip AB  Comment #6: R x5, L x6  Exercise #7: prone hip extension  Comment #7: 20  Exercise #8: isometric hip adduction with pillow betwen knees  Comment #8: 10 seconds X 10 reps  Exercise #9: all 4's with hips over ball, bend knee , hip ext raising foot up toward celing  Comment #9: 20 reps   Wall slide  With ball behind back  X 20 reps  Total Gym L 25 X 15 reps  Home gym: leg press  HS ball roll  Bridge with ball  Lateral and forward step down 4\" step  Bike, seat 9, resistance 7   3'  Wrist: flexion, extension, radial deviation, ulnar deviation   Trunk rotation with pulleys      Treatment Today     TREATMENT MINUTES COMMENTS   Evaluation     Self-care/ Home management     Manual therapy     Neuromuscular Re-education     Therapeutic Activity     Therapeutic Exercises 30 See flow sheet or above   Gait training     Modality__________________     ultrasound           Total 30    Blank areas are intentional and mean the treatment did not include these items.       Marleny Arana, PT  9/5/2017    "

## 2021-06-12 NOTE — PROGRESS NOTES
[facility] Daily Progress     Patient Name: Sb Shultz  Date: 2017  Visit #: 6  PTA # 2  Referral Diagnosis: Arthralgia of right knee; right medial epicondylitis  Referring provider: Joon Simpson*  Visit Diagnosis:   No diagnosis found.      Assessment:     Patient is benefitting from skilled physical therapy and is making steady progress toward functional goals.  Patient is appropriate to continue with skilled physical therapy intervention, as indicated by initial plan of care.  Initial goals remain appropriate.  Pt is able to perform higher level CKC exercises without pain.    Goal Status: Progressing towards  Pt. will be independent with home exercise program in : 6 weeks  Pt will: Able to return to hiking for 1 hour within 4-6 weeks  Pt will: Able to step through up and down stairs with normal pace within 4-6 weeks  Pt will: Able to stand from sitting with pain 0-1/10 within 4-6 weeks  Pt will: Able to walk on even surfaces, even carrying objects >15#, with normal pattern and pace within 4-6 weeks    Plan / Patient Education:     Review stretches and strengthening prn  Progress core and hip strengthening    Re assess need for US.  Pt is going to try the leg press at the gym.    Subjective:   Pt reports he is able to walk with minimal pain. Pt states descending stairs is still painful.  Pt reports R medial knee pain and occasional superior knee pain.  Pt feels that he is 40% better.  Pt has been walking about 3 miles on average.  Pt states he feels stronger and more stable.  Pain Ratin currently         Objective:     Exercises:  Exercise #1: Reviewed current strentches: SKC, LTR, piriformis, supine hamstring  Exercise #2: HS strethc  Comment #2: 30 seconds X 1-3 reps  Exercise #3: quad set  Comment #3: 10 seconds x 10 reps  Exercise #4: SLR with quad set  Comment #4: work up to 20  Exercise #5: SL clamshell B  Comment #5: x10  Exercise #6: SL hip AB  Comment #6: R x5, L  x6  Exercise #7: prone hip extension  Comment #7: 20  Exercise #8: isometric hip adduction with pillow betwen knees  Comment #8: 10 seconds X 10 reps  Exercise #9: all 4's with hips over ball, bend knee , hip ext raising foot up toward celing  Comment #9: 20 reps   Wall slide  With ball behind back  X 20 reps    Treatment Today     TREATMENT MINUTES COMMENTS   Evaluation     Self-care/ Home management     Manual therapy     Neuromuscular Re-education     Therapeutic Activity     Therapeutic Exercises 30 See above or flow sheet  Total gym leg press level 25 x 15  Added to home/gym program: leg press, HS with ball, bridge with ball, lateral and forward step downs on 4' step   Gait training     Modality__________________     ultrasound  Not today         Total 30    Blank areas are intentional and mean the treatment did not include these items.       Josette Melton, PTA,CLT  8/31/2017

## 2021-06-12 NOTE — PROGRESS NOTES
[facility] Daily Progress     Patient Name: Sb Shultz  Date: 2017  Visit #: 3  Referral Diagnosis: Arthralgia of right knee; right medial epicondylitis  Referring provider: Joon Simpson*  Visit Diagnosis:     ICD-10-CM    1. Acute pain of right knee M25.561    2. Medial epicondylitis, right M77.01          Assessment:     Patient is benefitting from skilled physical therapy and is making steady progress toward functional goals.  Patient is appropriate to continue with skilled physical therapy intervention, as indicated by initial plan of care.  Pt has been able to bike with no pain, but now when he does get pain it is more in the medial joint line.  Pt did not feel there was a benefit with the MFR so it was decided we try the ultrasound.    Goal Status:  Pt. will be independent with home exercise program in : 6 weeks  Pt will: Able to return to hiking for 1 hour within 4-6 weeks  Pt will: Able to step through up and down stairs with normal pace within 4-6 weeks  Pt will: Able to stand from sitting with pain 0-1/10 within 4-6 weeks  Pt will: Able to walk on even surfaces, even carrying objects >15#, with normal pattern and pace within 4-6 weeks    Plan / Patient Education:     Review stretches and strengthening prn  Clamshells  ultrasound    Subjective:     Pain Ratin    Feels a little better but progress is very slow  Did a lot of biking and walking while away last weeks  Ice seems to make it worse.  My muscles tighten up a lot so may try heat.    Objective:     Exercises:  Exercise #1: Reviewed current strentches: SKC, LTR, piriformis, supine hamstring  Exercise #2: Added: sitting hamstring stretche  Comment #2: 30 seconds X 1-3 reps  Exercise #3: quad set  Comment #3: 10 seconds x 10 reps  Exercise #4: SLR with quad set  Comment #4: work up to 20   Wall slide  With ball behind back  X 20 reps    Treatment Today     TREATMENT MINUTES COMMENTS   Evaluation     Self-care/ Home  management     Manual therapy     Neuromuscular Re-education     Therapeutic Activity     Therapeutic Exercises 15 See above or flow sheet; discussed icing after hiking this next week for preventative reasons   Gait training     Modality__________________     ultrasound 10 Pt supine with bolster under knees;  100%, .9w/cm2, 1 MHz, right medial knee         Total 25    Blank areas are intentional and mean the treatment did not include these items.       Marleny Arana, PT  8/21/2017

## 2021-06-12 NOTE — PROGRESS NOTES
[facility] Daily Progress     Patient Name: Sb Shultz  Date: 2017  Visit #: 5  PTA # 1  Referral Diagnosis: Arthralgia of right knee; right medial epicondylitis  Referring provider: Joon Simpson*  Visit Diagnosis:     ICD-10-CM    1. Acute pain of right knee M25.561    2. Medial epicondylitis, right M77.01          Assessment:     Patient is benefitting from skilled physical therapy and is making steady progress toward functional goals.  Patient is appropriate to continue with skilled physical therapy intervention, as indicated by initial plan of care.  Initial goals remain appropriate.  Pt feels the pain comes and goes.  Overall he feels better but some pain remains.  He feels he has a meniscus tear so has looked it up on the internet to see what he needs to do and that it is not uncommon to continue to have pain    Goal Status: On going  Pt. will be independent with home exercise program in : 6 weeks  Pt will: Able to return to hiking for 1 hour within 4-6 weeks  Pt will: Able to step through up and down stairs with normal pace within 4-6 weeks  Pt will: Able to stand from sitting with pain 0-1/10 within 4-6 weeks  Pt will: Able to walk on even surfaces, even carrying objects >15#, with normal pattern and pace within 4-6 weeks    Plan / Patient Education:     Review stretches and strengthening prn  Re assess US  Progress core and hip strengthening    Determine the effectiveness of the ultrasound versus trying e-stim    Subjective:     Pain Ratin currently     Think I am starting to get ahead, then I take one step back  I got new running shoes. They feel good  Took a 3 1/2 mile walk and it felt ok  Able to bike at the gym today without much difficulty  Think maybe, the ultrasound helped.    Objective:     Exercises:  Exercise #1: Reviewed current strentches: SKC, LTR, piriformis, supine hamstring  Exercise #2: HS strethc  Comment #2: 30 seconds X 1-3 reps  Exercise #3: quad  set  Comment #3: 10 seconds x 10 reps  Exercise #4: SLR with quad set  Comment #4: work up to 20  Exercise #5: SL clamshell B  Comment #5: x10  Exercise #6: SL hip AB  Comment #6: R x5, L x6  Exercise #7: prone hip extension  Comment #7: 20  Exercise #8: isometric hip adduction with pillow betwen knees  Comment #8: 10 seconds X 10 reps  Exercise #9: all 4's with hips over ball, bend knee , hip ext raising foot up toward celing  Comment #9: 20 reps   Wall slide  With ball behind back  X 20 reps    Treatment Today     TREATMENT MINUTES COMMENTS   Evaluation     Self-care/ Home management     Manual therapy     Neuromuscular Re-education     Therapeutic Activity     Therapeutic Exercises 15 See above or flow sheet   Gait training     Modality__________________     ultrasound 10 Pt supine with bolster under knees;  100%, .9w/cm2, 1 MHz, right medial knee   #3         Total 25    Blank areas are intentional and mean the treatment did not include these items.       Marleny Arana, PT  8/29/2017

## 2021-06-12 NOTE — PROGRESS NOTES
[facility]   Knee Initial Evaluation    Patient Name: Sb Shultz  Date of evaluation: 8/9/2017  Referral Diagnosis: Right Knee and Right Elbow Pain  Referring provider: Joon Simpson*  Visit Diagnosis:     ICD-10-CM    1. Acute pain of right knee M25.561    2. Medial epicondylitis, right M77.01        Assessment:      Pt. is appropriate for skilled PT intervention as outlined in the Plan of Care (POC).  Pt. is a good candidate for skilled PT services to improve pain levels and function.    Goals:  Pt. will be independent with home exercise program in : 6 weeks  Pt will: Able to return to hiking for 1 hour within 4-6 weeks  Pt will: Able to step through up and down stairs with normal pace within 4-6 weeks  Pt will: Able to stand from sitting with pain 0-1/10 within 4-6 weeks  Pt will: Able to walk on even surfaces, even carrying objects >15#, with normal pattern and pace within 4-6 weeks    Patient's expectations/goals are realistic.    Barriers to Learning or Achieving Goals:  hyperlipidemia       Plan / Patient Instructions:      Plan of Care:   Authorization / Certification Start Date: 08/09/17  Authorization / Certification End Date: 11/05/17  Authorization / Certification Number of Visits: 8-12  Communication with: Referral Source  Patient Related Instruction: Nature of Condition;Treatment plan and rationale;Self Care instruction;Basis of treatment  Times per Week: 2  Number of Weeks: 5-6  Number of Visits: 8-12  Therapeutic Exercise: ROM;Stretching;Strengthening  Neuromuscular Reeducation: kinesio tape  Manual Therapy: myofascial release;soft tissue mobilization  Modalities: ultrasound;electrical stimulation    Plan for next visit: Intro stretches for right LE                                FM/MFR right vastus medials/adductor near insertion at knee     Subjective:        Social information:   Living Situation:single family home and lives with others    Occupation:college  professor     History of Present Illness:    Sb is a 46 y.o. male who presents to therapy today with complaints of right knee and right elbow pain. Date of onset/duration of symptoms is 2017. Onset was gradual. He was walking up stream then a mile hike back.  That afternoon he twisted his knee and knew something was wrong.  He iced it for a few days then a week or two after that he went fishing again but had not problems.  That afternoon he hit golf balls and noticed the pain again.  It resolved so this past weekend he went for a hike and noticed knee pain again.  He has been using ice since and has not been doing any other activities.  Over the years he feels this knee has been weak but has never had any problems with it.   Symptoms are intermittent and not improving. Denies having X-rays or any imaging.  He denies locking or giving away sensations.  Yesterday his knee was very painful and today it feels better, but is wearing an ace bandage.  His elbow has been bothering him since this spring.  He feels it may be related to fishing, golfing and tennis.  He has looked up exercises on the internet and it has started feeling better with that.      Pain Ratin  Pain rating at best: 0  Pain rating at worst: 5  Pain description:sharp, shooting and weakness    Functional limitations are described as occurring with:   Getting out of a chair, walking on even surfaces, carrying objects, fishing and hiking, stairs    Patient reports benefit from:  ice, knee brace and sitting       Objective:      Note: Items left blank indicates the item was not performed or not indicated at the time of the evaluation.    Patient Outcome Measures :    Lower Extremity Functional Scale (_/80): 43   Scores range from 0-80, where a score of 80 represents maximum function. The minimal clinically important difference is a positive change of 9 points.  QuickDASH Score: 7   Scores range from 0-100, where a score of 0 represents minimal  pain and maximal function. The minimal clinically important difference is a negative chagne of 16 points.     Knee Examination  1. Acute pain of right knee     2. Medial epicondylitis, right       Precautions/Restrictions:  None  Involved Side: Right   Right side dominant  Posture Observation:      General standing posture is fair.  Assistive Device: None  Gait Observation: mild to moderate toe out on right  Hip Clearing: Does not provoke symptoms    Knee ROM Within normal limits unless otherwise indicated     Date:  17    AROM in degrees  Right   Left  Right   Left  Right   Left       Knee Flexion  (130 )   135   138                   Knee Extension  (0 )   0   0                 PROM in degrees  Right   Left  Right   Left  Right   Left       Knee Flexion  (130 )                         Knee Extension  (0 )                       LE Strength    Within normal limits unless otherwise indicated     Date:  17   Strength (MMT/5)  Right   Left  Right   Left  Right   Left       Hip Flexion   5   5                   Hip Abduction   5   5                   Hip Adduction   5, pain with hand placement   5                   Hip Extension   5   5                   Hip Internal Rotation                         Hip External Rotation                         Knee Extension   5   5                   Knee Flexion   5   5                   Ankle Dorsiflexion                         Ankle Plantarflexion                       Flexibility:  Decreased with bilateral hip IR    Palpation:  Tender over vastus medialis at insertion on right                    Tender over right medial epicondyle and wrist flexor tendon    1 Leg Stance: 20+ seconds bilaterally; more unstable on right LE  Squattin/4 squat only (self limited), hips deviate to right, no pain    Knee Special Tests (+/-):      Knee OA Cluster   Right   Left   Ligament Tests   Right   Left    1. > 49 y/o           Lachman   -   -    2. Stiffness > 30 min.           Anterior  Drawer          3. Crepitus           Posterior Drawer   -   -    4. Bony tenderness           Posterior Sag          5. Bone enlargement           Valgus Stress   -   -    6. No warmth to the touch           Varus Stress   -   -     Meniscal Tests   Right   Left    Other   Right    Left       Juan's   -   -     Ely's             Joint line tenderness   -   -     Yvonne Greerey's                        Elbow / Wrist Special Tests:  Elbow Right(+/-) Left (+/-) Wrist Right (+/-) Left (+/-)   Elbow extension   Axial load of thumb     Valgus stress   Scaphoid shift test     Varus stress   Finkelstein s test     Moving valgus stress   Carpal compression     Tinel s test - - Tinel s test     Ulnar nerve compression - - Phalen s test     Medialepicondylalgia  cluster.  Pain with: + - Clinical prediction rule for CTS:       Palpation of medial. Epi. + -   Age >45       Resisted wrist flexion +    Shaking hands relieves symptoms       Resisted middle finger extension     Wrist Ratio Index >0.67     Other:     Reduced Sensory Field First Digit     Other:   TFCC Lift Test     Other   TFCC Load Test         Treatment today:  Edu on ice massage for elbow and knee.  Ice 10 minutes  To wean out of ace bandage and knee sleeve for knee  Brief edu on shoes  Ice  Wrist flexion and extension stretches for elbow    Treatment Today     TREATMENT MINUTES COMMENTS   Evaluation 30 Knee and elbow   Self-care/ Home management     Manual therapy     Neuromuscular Re-education     Therapeutic Activity     Therapeutic Exercises 23 Edu on DX and POC.  See above   Gait training     Modality__________________                Total 53    Blank areas are intentional and mean the treatment did not include these items.     PT Evaluation Code: (Please list factors)  Patient History/Comorbidities: hyperlipidemia  Examination: cautious/guarded with right LE movement, decrease in functional  activities  Clinical Presentation: stable  Clinical Decision Making: low    Patient History/  Comorbidities Examination  (body structures and functions, activity limitations, and/or participation restrictions) Clinical Presentation Clinical Decision Making (Complexity)   No documented Comorbidities or personal factors 1-2 Elements Stable and/or uncomplicated Low   1-2 documented comorbidities or personal factor 3 Elements Evolving clinical presentation with changing characteristics Moderate   3-4 documented comorbidities or personal factors 4 or more Unstable and unpredictable High              Marleny Arana, PT  8/9/2017  1:57 PM

## 2021-06-12 NOTE — PROGRESS NOTES
[facility] Daily Progress     Patient Name: Sb Shultz  Date: 2017  Visit #: 2  Referral Diagnosis: Arthralgia of right knee; right medial epicondylitis  Referring provider: Joon Simpson*  Visit Diagnosis:     ICD-10-CM    1. Acute pain of right knee M25.561    2. Medial epicondylitis, right M77.01          Assessment:     Patient is benefitting from skilled physical therapy and is making steady progress toward functional goals.  Patient is appropriate to continue with skilled physical therapy intervention, as indicated by initial plan of care.    Goal Status:  Pt. will be independent with home exercise program in : 6 weeks  Pt will: Able to return to hiking for 1 hour within 4-6 weeks  Pt will: Able to step through up and down stairs with normal pace within 4-6 weeks  Pt will: Able to stand from sitting with pain 0-1/10 within 4-6 weeks  Pt will: Able to walk on even surfaces, even carrying objects >15#, with normal pattern and pace within 4-6 weeks    Plan / Patient Education:     Review stretches and strengthening prn  Petra  FM/MFR    Subjective:     Pain Ratin    My knee is feeling better than last visit.    Walking is better and I can walk faster with no increase in pain  I have not worn the knee brace at all  I have also been riding my bike and using ice.(ice massage)   Ice massage feels to cold at elbow (suggested ice bags)      Objective:     Exercises:  Exercise #1: Reviewed current strentches: SKC, LTR, piriformis, supine hamstring  Exercise #2: Added: sitting hamstring stretche  Comment #2: 30 seconds X 1-3 reps  Exercise #3: quad set  Comment #3: 10 seconds x 10 reps  Exercise #4: SLR with quad set  Comment #4: work up to 20    Treatment Today     TREATMENT MINUTES COMMENTS   Evaluation     Self-care/ Home management     Manual therapy 15 Pt supine with pillow under knees;  MFR and FM to right vastus medialis   Neuromuscular Re-education     Therapeutic Activity      Therapeutic Exercises 15 See above or flow sheet; discussed icing after hiking this next week for preventative reasons   Gait training     Modality__________________                Total 30    Blank areas are intentional and mean the treatment did not include these items.       Marleny Arana, PT  8/14/2017

## 2021-06-13 NOTE — PROGRESS NOTES
[facility] Daily Progress     Patient Name: Sb Shultz  Date: 2017  Visit #: 8  PTA # 3  Referral Diagnosis: Arthralgia of right knee; right medial epicondylitis  Referring provider: Joon Simpson*  Visit Diagnosis:     ICD-10-CM    1. Acute pain of right knee M25.561    2. Medial epicondylitis, right M77.01          Assessment:     Initial goals have been met.   Pt should do well on an independent home program.    Goal Status: Met  Pt. will be independent with home exercise program in : 6 weeks  Pt will: Able to return to hiking for 1 hour within 4-6 weeks  Pt will: Able to step through up and down stairs with normal pace within 4-6 weeks  Pt will: Able to stand from sitting with pain 0-1/10 within 4-6 weeks  Pt will: Able to walk on even surfaces, even carrying objects >15#, with normal pattern and pace within 4-6 weeks         Plan / Patient Education:     No further appointments scheduled at this time.   We will keep his chart open for 2 weeks.   Pt was instructed to call this clinic if questions arise.    Subjective:     Pain Ratin currently    Pt was able to hike without pain. He used his hiking poles which helped.  Pt has not tried to golf or fish. He is concerned about walking against the current in the river to fish.    Objective:     Exercises:  Exercise #1: Reviewed current strentches: SKC, LTR, piriformis, supine hamstring  Exercise #2: HS strethc  Comment #2: 30 seconds X 1-3 reps  Exercise #3: quad set  Comment #3: 10 seconds x 10 reps  Exercise #4: SLR with quad set  Comment #4: work up to 20  Exercise #5: SL clamshell B  Comment #5: x10  Exercise #6: SL hip AB  Comment #6: R x5, L x6  Exercise #7: prone hip extension  Comment #7: 20  Exercise #8: isometric hip adduction with pillow betwen knees  Comment #8: 10 seconds X 10 reps  Exercise #9: all 4's with hips over ball, bend knee , hip ext raising foot up toward celing  Comment #9: 20 reps   Wall slide  With ball  "behind back  X 20 reps  Total Gym L 25 X 15 reps  Home gym: leg press  HS ball roll  Bridge with ball  Lateral and forward step down 4\" step  Bike, seat 9, resistance 7   3'  Wrist: flexion, extension, radial deviation, ulnar deviation   Trunk rotation with pulleys      Treatment Today     TREATMENT MINUTES COMMENTS   Evaluation     Self-care/ Home management     Manual therapy     Neuromuscular Re-education     Therapeutic Activity     Therapeutic Exercises 30 See flow sheet or above  Reviewed and finalized HEP  Objective measures taken   Gait training     Modality__________________     ultrasound           Total 30    Blank areas are intentional and mean the treatment did not include these items.       Josette Melton, PTA,CLT  9/28/2017    "

## 2021-06-13 NOTE — PROGRESS NOTES
Chief Complaint   Patient presents with     Immunizations     Flu consent and contraindication forms are given/ signed/ reviewed and sent to medical records to scan.     Yadira Hooper CMA [facility] 9/29/2017 1:32 PM

## 2021-06-13 NOTE — PROGRESS NOTES
[facility] Discharge Summary  Patient Name: Sb Shultz  Date: 10/10/2017  Referral Diagnosis: Right Knee and Right Elbow Pain  Referring provider: Joon Simpson*  Visit Diagnosis:   1. Acute pain of right knee     2. Medial epicondylitis, right         Goals:  Pt. will be independent with home exercise program in : 6 weeks  Pt will: Able to return to hiking for 1 hour within 4-6 weeks  Pt will: Able to step through up and down stairs with normal pace within 4-6 weeks  Pt will: Able to stand from sitting with pain 0-1/10 within 4-6 weeks  Pt will: Able to walk on even surfaces, even carrying objects >15#, with normal pattern and pace within 4-6 weeks    Patient was seen for 8 visits from 8/9/17 to 9/28/17 with 0 missed appointments.  please see below    Therapy will be discontinued at this time.  The patient will need a new referral to resume.    Thank you for your referral.  Marleny Arana  10/10/2017  2:04 PM

## 2021-06-13 NOTE — PROGRESS NOTES
ASSESSMENT:  1. Heart palpitations  - Electrocardiogram Perform and Read      PLAN:  We did discuss his general symptoms at this point.  Examination did not show any irregularity to his heartbeat.  We discussed possibility of anxiety and being part of what is going on at this point.  He is PHQ 9 and CHRISTINA 7 and normal this point.  I did also get an EKG which is within normal limits.  I did encourage him to increase his fluid intake, increase his physical activity with regards to increasing his cardio exercises.  He will inform me if there is worsening of his symptoms so that we can consider doing a Holter monitor at that point.  We also reviewed his cardiovascular risk which at this point appears low with his cholesterol levels.  I will follow-up with him and he will let us know how he is doing.    Orders Placed This Encounter   Procedures     Electrocardiogram Perform and Read     There are no discontinued medications.    No Follow-up on file.      CHIEF COMPLAINT:  Chief Complaint   Patient presents with     Irregular Heart Beat     anxiety feels irregular heart beats, 1-5 irregular beats daily       HISTORY OF PRESENT ILLNESS:  Sb is a 46 y.o. male presenting to the clinic today with concerns of having intermittent episodes of what he describes as irregular heartbeat.  Noted that he will intermittently have about 1 to 5 episodes of irregular heartbeat, he noted that the beats are about once or twice at most each time.  He denied having any chest pain or shortness of breath with the episodes.  Denied having any lightheadedness.  He does exercise and is able to maintain good physical activity without any chest pain.  He noted having had this kind of issue between he is ago when he was at school.  He was evaluated and was told that he is a little anxious at that point.  He was reassured and since then he did not have any other irregular heartbeats up until now.  Noted that he appears to get a little bit anxious  about his health and being able to monitor his symptoms as he gets them.  He is worried and wanted to have that checked out.    REVIEW OF SYSTEMS:   He denied having any lightheadedness or dizziness, there is no's lower extremity swellings.  There is no depression but he does feel slightly worried about a lot of things.  No swellings to his lower extremities.  He does not have any nausea vomiting or abdominal pain.  All other systems are negative.    PFSH:  Reviewed, as below.    History   Smoking Status     Never Smoker   Smokeless Tobacco     Never Used       Family History   Problem Relation Age of Onset     Breast cancer Mother      Colon cancer Father      Heart disease Father      Diabetes Father      Snoring Father      No Medical Problems Brother        Social History     Social History     Marital status:      Spouse name: N/A     Number of children: N/A     Years of education: N/A     Occupational History     Not on file.     Social History Main Topics     Smoking status: Never Smoker     Smokeless tobacco: Never Used     Alcohol use 0.6 oz/week     1 Shots of liquor per week     Drug use: Not on file     Sexual activity: Yes     Partners: Female     Other Topics Concern     Not on file     Social History Narrative       No past surgical history on file.    No Known Allergies    Active Ambulatory Problems     Diagnosis Date Noted     Dysphagia      Neck Pain      Vitamin D Deficiency      Hyperlipidemia      Nonvenomous Insect Bite Of Leg      TMJ Pain      Routine general medical examination at a health care facility 01/20/2015     Resolved Ambulatory Problems     Diagnosis Date Noted     No Resolved Ambulatory Problems     Past Medical History:   Diagnosis Date     Anxiety        Current Outpatient Prescriptions   Medication Sig Dispense Refill     cholecalciferol, vitamin D3, 1,000 unit capsule Take 1,000 Units by mouth daily.       No current facility-administered medications for this visit.         VITALS:  Vitals:    10/23/17 0905   BP: 124/70   Pulse: 84   Weight: 184 lb 14.4 oz (83.9 kg)     Wt Readings from Last 3 Encounters:   10/23/17 184 lb 14.4 oz (83.9 kg)   05/01/17 188 lb 12.8 oz (85.6 kg)   03/08/17 190 lb 9.6 oz (86.5 kg)     Body mass index is 22.81 kg/(m^2).    PHYSICAL EXAM:  General Appearance: Alert, cooperative, no distress, appears stated age.  He does look worried but good eye contact.  HEENT: Pupils are equal and reactive, extraocular motions is normal.  Oropharynx is moist.  Neck is supple no notable thyromegaly.  External ears are normal.  Lungs: Clear to auscultation bilaterally, respirations unlabored  Heart: Regular rate and rhythm, S1 and S2 normal, no murmur, rub, or gallop  Abdomen: Soft  Musculoskeletal: Normal range of motion. No joint swelling or deformity.   Neurologic:  Alert and oriented times 3. Normal reflexes. Cranial nerves II-XII intact.   Psychiatric: He does have normal mood but is worried appearing.  There is good concentration.    MEDICATIONS:  Current Outpatient Prescriptions   Medication Sig Dispense Refill     cholecalciferol, vitamin D3, 1,000 unit capsule Take 1,000 Units by mouth daily.       No current facility-administered medications for this visit.

## 2021-06-17 ENCOUNTER — OFFICE VISIT - HEALTHEAST (OUTPATIENT)
Dept: FAMILY MEDICINE | Facility: CLINIC | Age: 51
End: 2021-06-17

## 2021-06-17 DIAGNOSIS — M79.671 RIGHT FOOT PAIN: ICD-10-CM

## 2021-06-17 DIAGNOSIS — M79.671 PAIN IN RIGHT FOOT: ICD-10-CM

## 2021-06-17 RX ORDER — MOMETASONE FUROATE 1 MG/G
CREAM TOPICAL
Status: SHIPPED | COMMUNITY
Start: 2021-01-13

## 2021-06-18 NOTE — PROGRESS NOTES
MALE PREVENTATIVE EXAM    Assessment and Plan:       1. Routine general medical examination at a health care facility  - Lipid Cascade  - Comprehensive Metabolic Panel  - PSA, Annual Screen (Prostatic-Specific Antigen)  - JIC LAV    Next follow up:  In 1 year or earlier as needed.    Immunization Review  Adult Imm Review: No immunizations due today  Does not smoke.  I discussed the following with the patient:   Adult Healthy Living: Importance of regular exercise  Healthy nutrition  Stress management  Sporting equipment safety    I have had an Advance Directives discussion with the patient.    Subjective:   Chief Complaint: Sb Shultz is an 47 y.o. male here for a preventative health visit.     HPI:  Comes in for routine physical exam. 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    Healthy Habits  Are you taking a daily aspirin? No  Do you typically exercising at least 40 min, 3-4 times per week?  Yes  Do you usually eat at least 4 servings of fruit and vegetables a day, include whole grains and fiber and avoid regularly eating high fat foods? Yes  Have you had an eye exam in the past two years? Yes  Do you see a dentist twice per year? Yes  Do you have any concerns regarding sleep? No    Safety Screen  If you own firearms, are they secured in a locked gun cabinet or with trigger locks? The patient does not own any firearms  Do you feel you are safe where you are living?: Yes (5/16/2018  8:33 AM)  Do you feel you are safe in your relationship(s)?: Yes (5/16/2018  8:33 AM)    Review of Systems:  Please see above.  The rest of the review of systems are negative for all systems.     Cancer Screening     Patient has no health  "maintenance due at this time          Patient Care Team:  Joon Simpson MD as PCP - General        History     Reviewed By Date/Time Sections Reviewed    Ashley Blanchard CMA 5/16/2018  8:33 AM Tobacco            Objective:   Vital Signs:   Visit Vitals     /72 (Patient Site: Left Arm, Patient Position: Sitting, Cuff Size: Adult Large)     Pulse 76     Ht 6' 3.75\" (1.924 m)     Wt 184 lb 14.4 oz (83.9 kg)     BMI 22.66 kg/m2          PHYSICAL EXAM  General Appearance: Alert, cooperative, no distress, appears stated age  Head: Normocephalic, without obvious abnormality, atraumatic  Eyes: PERRL, conjunctiva/corneas clear, EOM's intact  Ears: Normal TM's and external ear canals, both ears  Nose: Nares normal, septum midline,mucosa normal, no drainage  Throat: Lips, mucosa, and tongue normal; teeth and gums normal  Neck: Supple, symmetrical, trachea midline, no adenopathy;  thyroid: not enlarged, symmetric, no tenderness/mass/nodules; no carotid bruit or JVD  Back: Symmetric, no curvature, ROM normal, no CVA tenderness  Lungs: Clear to auscultation bilaterally, respirations unlabored  Heart: Regular rate and rhythm, S1 and S2 normal, no murmur, rub, or gallop,  Abdomen: Soft, non-tender, bowel sounds active all four quadrants,  no masses, no organomegaly.  Genitourinary:Normal circumcised penis with descended testicles.Some sensitivity to touch but not tender.No lumps felt.   Musculoskeletal: Normal range of motion. No joint swelling or deformity.   Extremities: Extremities normal, atraumatic, no cyanosis or edema  Skin: Skin color, texture, turgor normal, no rashes . Seborrhea noted on the back.  Lymph nodes: Cervical, supraclavicular, and axillary nodes normal  Neurologic: He is alert. He has normal reflexes.   Psychiatric: He has a normal mood and affect.       The 10-year ASCVD risk score (Jorge RANJIT Jr, et al., 2013) is: 1.7%    Values used to calculate the score:      Age: 47 years      Sex: " Male      Is Non- : No      Diabetic: No      Tobacco smoker: No      Systolic Blood Pressure: 124 mmHg      Is BP treated: No      HDL Cholesterol: 57 mg/dL      Total Cholesterol: 205 mg/dL         Medication List          These changes are accurate as of 5/16/18  9:46 AM.  If you have any questions, ask your nurse or doctor.               CONTINUE taking these medications          cholecalciferol (vitamin D3) 1,000 unit capsule   INSTRUCTIONS:  Take 1,000 Units by mouth daily.                 Additional Screenings Completed Today:

## 2021-06-24 NOTE — TELEPHONE ENCOUNTER
Triage note:    48 year old male called with concerns about right eye dryness. He was told by eye doctor 1.5 years ago that he has some condition that causes dry/scratchy eyes and was advised to use sodium chloride eye gtts which he does.     However, one month ago, his right eye became dry when he would watch tv at night for an hour. When waking up, it may be better or it may still be dry.  The eye doctor gave him sodium chloride eye gtts. And then advised to try some lubricant drops. It got better but then came back after decreasing the lubricant eye gtts. Over the weekend, he increased lubricant eye gtts, then when he wakes up that eye is cloudy and he has to blink it away.  Now he is using a warm wash cloth on his right eye in the morning and uses both sodium chloride and lubricant eye drops.  No pain. Vision is normal. No eye discharge.  No swelling. His last visit with eye doctor was in December 2018.     He has a high deductible insurance plan so he doesn't want to be seen unless provider thinks he should.     He has the following questions:    Is it okay to take lubricant eye drops multiple times per day? (Refresh Omega 3 eye gtts)    Does he need to wait it out?    Does he need to be seen by PCP?    Or does he need a referral to eye doctor? (He usually goes to [facility] in [place])?      *Ok to leave a detailed message upon call back          Reason for Disposition    Nursing judgment    Protocols used: NO PROTOCOL AVAILABLE - SICK ADULT-A-OH

## 2021-06-24 NOTE — TELEPHONE ENCOUNTER
I contacted the patient and he had already spoken with the eye clinic. They recommend that the patient is seen to check eyes. Patient is requesting a referral to [facility] in [place]. Phone number is [phone]. Order prepped   Maria R Brizuela LPN

## 2021-06-24 NOTE — TELEPHONE ENCOUNTER
It appears that the lubricant eyedrops is thick and that makes his eyes blurry when he wakes up.  Because of that applying it multiple times a day may not be the best option.  I think that you may need to be seen by the eye doctor on follow-up to make sure nothing else is a concern the dryness of the eyes.  Otherwise he can still come back in to be seen here so that we can attempt evaluation for any systemic cause of dryness of the eyes.

## 2021-06-26 NOTE — PROGRESS NOTES
Assessment & Plan     Right foot pain    With his ongoing pain here in this right foot, as well as the fact that he has had this ongoing now for a few years and is not going away or getting better, I think I will send him to our podiatry group.  They can evaluate him and determine in good pathway forward for him on this.  In the meantime he has certain shoes that he has better wearing than others and he can continue to use those for comfort until we get this figured out a little bit more.  He was reassured that the x-ray looked pretty unremarkable today.      - XR Foot Right 3 or More VWS  - Ambulatory referral to Podiatry    Review of the result(s) of each unique test - xray   Ordering of each unique test  [id number]}     No follow-ups on file.    Chava Wills MD  [facility]   Sb Shultz is 50 y.o. and presents today for the following health issues   HPI     Patient here today with some foot pain on the right foot.  He states that a few years ago he was told that he had a bit of a cyst on the dorsum of the right foot.  Over time and with switching up his foot where it seemed to get better and go away, but now he is having trouble with it again.  He has been doing more hiking and in his preferred hiking boots it does seem to rub on it and bother it more.  It will be evident and then he will rest it for a few days and it goes down some.  Is not red or warm.  Is not really quite bad today.  No other trauma noted to the foot.    Review of Systems          Objective    /68 (Patient Site: Left Arm, Patient Position: Sitting, Cuff Size: Adult Regular)   Pulse 77   Wt 199 lb (90.3 kg)   SpO2 99%   BMI 24.22 kg/m    Body mass index is 24.22 kg/m .  Physical Exam    Well-appearing no acute distress.  Vital signs as noted.  He walks without a limp.  The foot looks pretty unremarkable.  He does have a little bit of tenderness at that dorsum but I do not feel any obvious  cyst that there is evident today but he states that today is actually a pretty good day for it.    Xr Foot Right 3 Or More Vws    Result Date: 6/17/2021  EXAM: XR FOOT RIGHT 3 OR MORE VWS LOCATION: [facility] DATE/TIME: 6/17/2021 8:47 AM INDICATION: Right foot pain COMPARISON: Lateral toe films 10/23/2020     Normal alignment without a fracture or dislocation. No effusion at the ankle. Minimal narrowing at the first MTP joint with small spur along the lateral metatarsal head. No periosteal new bone formation to suggest a stress response.

## 2021-06-27 ENCOUNTER — HEALTH MAINTENANCE LETTER (OUTPATIENT)
Age: 51
End: 2021-06-27

## 2021-06-29 ENCOUNTER — OFFICE VISIT - HEALTHEAST (OUTPATIENT)
Dept: PODIATRY | Facility: CLINIC | Age: 51
End: 2021-06-29

## 2021-06-29 DIAGNOSIS — M79.671 RIGHT FOOT PAIN: ICD-10-CM

## 2021-06-29 DIAGNOSIS — M79.671 PAIN IN RIGHT FOOT: ICD-10-CM

## 2021-06-29 DIAGNOSIS — M67.471 GANGLION CYST OF RIGHT FOOT: ICD-10-CM

## 2021-06-29 NOTE — PROGRESS NOTES
Progress Notes by Chava Wills MD at 8/3/2020  8:00 AM     Author: Chava Wills MD Service: -- Author Type: Physician    Filed: 8/3/2020  9:14 AM Encounter Date: 8/3/2020 Status: Signed    : Chava Wills MD (Physician)       MALE PREVENTATIVE EXAM    Assessment and Plan:       1. Routine medical exam    Generally the patient is doing very well.  We discussed healthy lifestyles, including adequate exercise (3-4 times a week for 20-30 minutes), and a healthy diet.  Patient should return for annual physicals, and we can also see them here as needed.       2. Generalized anxiety disorder    Just seems overall at a baseline of high anxiety, but he does not really want to take any medications.  He does teach psychology at a college so he has a bit of insight into his own anxiety.    3. Hyperlipidemia, unspecified hyperlipidemia type    - Lipid Profile    4. Screening for diabetes mellitus    - Basic Metabolic Panel        Next follow up:  No follow-ups on file.    Immunization Review  Adult Imm Review: No immunizations due today      I discussed the following with the patient:   Adult Healthy Living: Importance of regular exercise  Healthy nutrition  Getting adequate sleep  Stress management  Use of seat belts        Subjective:   Chief Complaint: Sb Shultz is an 49 y.o. male here for a preventative health visit.     HPI: He is generally in pretty good health.  He has no major concerns or problems today other than having me look at a couple of skin lesions and getting the usual blood work done.    He does have an overall heightened level of anxiety.  He is a  at [employer].  He does not take any medications for nor does he want to.  He just needs some things told to him that he does not worry about anymore and that he will worry about them.    He is fasting today so we can get some blood work done for him.    Healthy Habits  Are you taking a daily aspirin? No  Do you typically  "exercising at least 40 min, 3-4 times per week?  Yes  Do you usually eat at least 4 servings of fruit and vegetables a day, include whole grains and fiber and avoid regularly eating high fat foods? Yes  Have you had an eye exam in the past two years? Yes  Do you see a dentist twice per year? Yes  Do you have any concerns regarding sleep? No    Safety Screen  If you own firearms, are they secured in a locked gun cabinet or with trigger locks? The patient declines to answer  No data recorded    Review of Systems:  Please see above.  The rest of the review of systems are negative for all systems.     Cancer Screening     Patient has no health maintenance due at this time          Patient Care Team:  Chava Wills MD as PCP - General (Family Medicine)  Joon Simpson MD as Assigned PCP        History     Reviewed By Date/Time Sections Reviewed    Chava Wills MD 8/3/2020  8:09 AM Medical, Surgical, Tobacco, Alcohol, Drug Use, Sexual Activity, [facility]all[facility] 8/3/2020  8:01 AM Tobacco            Objective:   Vital Signs:   Visit Vitals  /70 (Patient Site: Left Arm, Patient Position: Sitting, Cuff Size: Adult Regular)   Pulse (!) 56   Ht 6' 4\" (1.93 m)   Wt 181 lb 14.4 oz (82.5 kg)   SpO2 100%   BMI 22.14 kg/m           PHYSICAL EXAM    Well developed, well nourished, no acute distress.  HEENT: normocephalic/atraumatic, PERRLA/EOMI, TMs: Gray, normal light reflex, no nasal discharge.  Oral mucosa: no erythema/exudate  Neck: No LAD/masses/thyromegaly/bruits  Lungs: clear bilaterally  Heart: regular rate and rhythm, no murmurs/gallops/rubs.  Abdomen: Normal bowel sounds, soft, non-tender, non-distended, no masses, neg Slade's/McBurney's, no rebound/guarding  Genital: Bilaterally descended testes, no masses, non-tender, no hernia.  No urethral discharge or erythema.  No lesions, normal phallus.  Lymphatics: no supraclavicular/axillary/epitrochlear/inguinal LAD. No edema.  Neuro: A&O x " 3, CN II-XII intact, strength 5/5, reflexes symmetric, sensory intact to light touch.  Psych: Behavior appropriate, engaging.  Thought processes congruent, non-tangential.  Musculoskeletal: no gross deformities.  Skin: no rashes or lesions.              Medication List          Accurate as of August 3, 2020  9:14 AM. If you have any questions, ask your nurse or doctor.            CONTINUE taking these medications    carboxymethylcellulose 0.5 % Dpet ophthalmic dropperette  Also known as:  REFRESH PLUS        cholecalciferol (vitamin D3) 25 mcg (1,000 unit) capsule  INSTRUCTIONS:  Take 1,000 Units by mouth daily.        fluticasone propionate 50 mcg/actuation nasal spray  Also known as:  FLONASE  INSTRUCTIONS:  SPRAY 2 SPRAYS INTO EACH NOSTRIL EVERY DAY AS DIRECTED               Additional Screenings Completed Today:

## 2021-07-06 VITALS
TEMPERATURE: 98.4 F | SYSTOLIC BLOOD PRESSURE: 115 MMHG | HEART RATE: 74 BPM | DIASTOLIC BLOOD PRESSURE: 75 MMHG | RESPIRATION RATE: 18 BRPM

## 2021-07-06 VITALS
BODY MASS INDEX: 24.22 KG/M2 | SYSTOLIC BLOOD PRESSURE: 116 MMHG | WEIGHT: 199 LBS | HEART RATE: 77 BPM | OXYGEN SATURATION: 99 % | DIASTOLIC BLOOD PRESSURE: 68 MMHG

## 2021-07-07 NOTE — PROGRESS NOTES
FOOT AND ANKLE SURGERY/PODIATRY CONSULT NOTE         ASSESSMENT: Ganglion cyst       TREATMENT:  -I was unable to palpate a soft tissue mass along the dorsal right midfoot at the area of the patient's concern. I reviewed the patient's x-rays which indicate normal bony alignment, no fracture noted.     -Based on the patient's description, the mass appears consistent with a ganglion cyst. We discussed conservative treatment to include alternating lacing and needle aspiration. Surgical excision was also reviewed including post-op course, risks including infection and recurrence of 20%.     -All questions invited and answered. I recommend he try conservative treatment at this time and return to see me if symptoms do not improve.     Bhavin Darby DPM  [facility]/Foot & Ankle Surgery      HPI: I was asked to see Sb Shultz today by Dr. Wills for a soft tissue lesion on his right foot. He reports first noticing the mass about three years ago after hiking. He has since noticed the soft tissue mass two times, each time seems to correspond to hiking activities. Currently the mass is not prominent. He is a teacher. PM reviewed.       Past Medical History:   Diagnosis Date     Anxiety        No past surgical history on file.    No Known Allergies      Current Outpatient Medications:      ascorbic acid, vitamin C, (VITAMIN C) 100 MG tablet, Take 100 mg by mouth daily., Disp: , Rfl:      carboxymethylcellulose (REFRESH PLUS) 0.5 % Dpet ophthalmic dropperette, , Disp: , Rfl:      cholecalciferol, vitamin D3, 1,000 unit capsule, Take 1,000 Units by mouth daily., Disp: , Rfl:      mometasone (ELOCON) 0.1 % cream, APPLY TO AFFECTED AREA TWICE A DAY AS DIRECTED APPLY SPARINGLY TO CLEAN SKIN, Disp: , Rfl:      fluticasone propionate (FLONASE) 50 mcg/actuation nasal spray, SPRAY 2 SPRAYS INTO EACH NOSTRIL EVERY DAY AS DIRECTED, Disp: , Rfl:     Family History   Problem Relation Age of Onset     Breast cancer Mother       Colon cancer Father      Heart disease Father      Diabetes Father      Snoring Father      Diabetes Brother        Social History     Socioeconomic History     Marital status:      Spouse name: Not on file     Number of children: Not on file     Years of education: Not on file     Highest education level: Not on file   Occupational History     Not on file   Social Needs     Financial resource strain: Not on file     Food insecurity     Worry: Not on file     Inability: Not on file     Transportation needs     Medical: Not on file     Non-medical: Not on file   Tobacco Use     Smoking status: Never Smoker     Smokeless tobacco: Never Used   Substance and Sexual Activity     Alcohol use: Yes     Alcohol/week: 1.0 standard drinks     Types: 1 Shots of liquor per week     Drug use: Never     Sexual activity: Yes     Partners: Female   Lifestyle     Physical activity     Days per week: Not on file     Minutes per session: Not on file     Stress: Not on file   Relationships     Social connections     Talks on phone: Not on file     Gets together: Not on file     Attends Sikh service: Not on file     Active member of club or organization: Not on file     Attends meetings of clubs or organizations: Not on file     Relationship status: Not on file     Intimate partner violence     Fear of current or ex partner: Not on file     Emotionally abused: Not on file     Physically abused: Not on file     Forced sexual activity: Not on file   Other Topics Concern     Not on file   Social History Narrative     Not on file       Review of Systems - 10 point Review of Systems is negative except for soft tissue mass right foot which is noted in HPI.    OBJECTIVE:  Appearance: alert, well appearing, and in no distress.    Vitals:    06/29/21 0848   BP: 115/75   Pulse: 74   Resp: 18   Temp: 98.4  F (36.9  C)       BMI= There is no height or weight on file to calculate BMI.    General appearance: Patient is alert and fully  cooperative with history & exam.  No sign of distress is noted during the visit.     Psychiatric: Affect is pleasant & appropriate.  Patient appears motivated to improve health.     Respiratory: Breathing is regular & unlabored while sitting.     HEENT: Hearing is intact to spoken word.  Speech is clear.  No gross evidence of visual impairment that would impact ambulation.      Vascular: Dorsalis pedis and posterior tibial pulses are palpable. There is pedal hair growth right.  CFT < 3 sec from anterior tibial surface to distal digits right. There is no appreciable edema noted.  Dermatologic: Turgor and texture are within normal limits. No coloration or temperature changes. No primary or secondary lesions noted.  Neurologic: All epicritic and proprioceptive sensations are grossly intact right.  Musculoskeletal: No raised soft tissue or bony prominence dorsal right midfoot.     Imaging:     Xr Foot Right 3 Or More Vws    Result Date: 6/17/2021  EXAM: XR FOOT RIGHT 3 OR MORE VWS LOCATION: [facility] DATE/TIME: 6/17/2021 8:47 AM INDICATION: Right foot pain COMPARISON: Lateral toe films 10/23/2020     Normal alignment without a fracture or dislocation. No effusion at the ankle. Minimal narrowing at the first MTP joint with small spur along the lateral metatarsal head. No periosteal new bone formation to suggest a stress response.    Xr Foot Right 3 Or More Vws Standing    Result Date: 6/29/2021  No fracture, dislocation noted. Contracted digits.

## 2021-07-07 NOTE — PATIENT INSTRUCTIONS - HE
Ganglion Cyst   A ganglion is a gelatinous fluid-filled, out-pouching of the lining of a joint or tendon. Typically, ganglion cysts grow slowly and occur on the foot or ankle.  Historically, ganglion cysts have been referred to as a bible cyst; referring to the practice of hitting the cyst with a book, or bible, to flatten the cyst.  However, because the cyst is well encapsulated, the cyst usually reappears.  Causes  Often minor trauma or repetitive trauma can irritate that lining of a joint or tendon sheath and cause a ganglion cyst to develop.  Symptoms  Ganglions develop and growth with little or no symptoms. However, if located along a bony prominence pain can develop.  Tingling or numbness can also occur if the ganlion cyst overlies the course of a nerve.  Diagnosis is often made by your Podiatric Foot and Ankle Surgeon during a physical examination.  Several soft tissue masses present in a similar fashion but can be rule out based on their defining characteristics.  Other diagnostic tools include a needle biopsy or an MRI which are used to confirm a diagnosis.  Treatment  Non-surgical treatment, including needle aspiration and padding, may temporarily relieve symptoms. However, if the encapsulated cyst is not removed in its entirety the cyst will reform. Therefore, ganglion cysts are often difficult to treat without surgery.  Surgical treatment is designed to remove the cyst and a small amount of the surrounding tissue to decrease the likelihood that the cyst will return.

## 2021-10-17 ENCOUNTER — HEALTH MAINTENANCE LETTER (OUTPATIENT)
Age: 51
End: 2021-10-17

## 2021-12-31 ENCOUNTER — OFFICE VISIT (OUTPATIENT)
Dept: FAMILY MEDICINE | Facility: CLINIC | Age: 51
End: 2021-12-31
Payer: COMMERCIAL

## 2021-12-31 VITALS
HEART RATE: 65 BPM | DIASTOLIC BLOOD PRESSURE: 76 MMHG | BODY MASS INDEX: 22.89 KG/M2 | OXYGEN SATURATION: 97 % | SYSTOLIC BLOOD PRESSURE: 112 MMHG | HEIGHT: 76 IN | WEIGHT: 188 LBS

## 2021-12-31 DIAGNOSIS — Z12.5 SCREENING FOR MALIGNANT NEOPLASM OF PROSTATE: ICD-10-CM

## 2021-12-31 DIAGNOSIS — E78.2 MIXED HYPERLIPIDEMIA: ICD-10-CM

## 2021-12-31 DIAGNOSIS — F41.1 GENERALIZED ANXIETY DISORDER: Chronic | ICD-10-CM

## 2021-12-31 DIAGNOSIS — E55.9 VITAMIN D DEFICIENCY: ICD-10-CM

## 2021-12-31 DIAGNOSIS — Z00.00 ROUTINE MEDICAL EXAM: Primary | ICD-10-CM

## 2021-12-31 LAB
ALBUMIN SERPL-MCNC: 4.2 G/DL (ref 3.5–5)
ALP SERPL-CCNC: 63 U/L (ref 45–120)
ALT SERPL W P-5'-P-CCNC: 41 U/L (ref 0–45)
ANION GAP SERPL CALCULATED.3IONS-SCNC: 11 MMOL/L (ref 5–18)
AST SERPL W P-5'-P-CCNC: 36 U/L (ref 0–40)
BILIRUB SERPL-MCNC: 0.9 MG/DL (ref 0–1)
BUN SERPL-MCNC: 10 MG/DL (ref 8–22)
CALCIUM SERPL-MCNC: 9.5 MG/DL (ref 8.5–10.5)
CHLORIDE BLD-SCNC: 104 MMOL/L (ref 98–107)
CHOLEST SERPL-MCNC: 203 MG/DL
CO2 SERPL-SCNC: 26 MMOL/L (ref 22–31)
CREAT SERPL-MCNC: 0.93 MG/DL (ref 0.7–1.3)
FASTING STATUS PATIENT QL REPORTED: YES
GFR SERPL CREATININE-BSD FRML MDRD: >90 ML/MIN/1.73M2
GLUCOSE BLD-MCNC: 93 MG/DL (ref 70–125)
HDLC SERPL-MCNC: 60 MG/DL
LDLC SERPL CALC-MCNC: 130 MG/DL
POTASSIUM BLD-SCNC: 4.2 MMOL/L (ref 3.5–5)
PROT SERPL-MCNC: 7.4 G/DL (ref 6–8)
PSA SERPL-MCNC: 1.11 UG/L (ref 0–2.5)
SODIUM SERPL-SCNC: 141 MMOL/L (ref 136–145)
TRIGL SERPL-MCNC: 66 MG/DL

## 2021-12-31 PROCEDURE — 80053 COMPREHEN METABOLIC PANEL: CPT | Performed by: FAMILY MEDICINE

## 2021-12-31 PROCEDURE — 96127 BRIEF EMOTIONAL/BEHAV ASSMT: CPT | Performed by: FAMILY MEDICINE

## 2021-12-31 PROCEDURE — 36415 COLL VENOUS BLD VENIPUNCTURE: CPT | Performed by: FAMILY MEDICINE

## 2021-12-31 PROCEDURE — 82306 VITAMIN D 25 HYDROXY: CPT | Performed by: FAMILY MEDICINE

## 2021-12-31 PROCEDURE — 90471 IMMUNIZATION ADMIN: CPT | Performed by: FAMILY MEDICINE

## 2021-12-31 PROCEDURE — 80061 LIPID PANEL: CPT | Performed by: FAMILY MEDICINE

## 2021-12-31 PROCEDURE — G0103 PSA SCREENING: HCPCS | Performed by: FAMILY MEDICINE

## 2021-12-31 PROCEDURE — 99396 PREV VISIT EST AGE 40-64: CPT | Mod: 25 | Performed by: FAMILY MEDICINE

## 2021-12-31 PROCEDURE — 90714 TD VACC NO PRESV 7 YRS+ IM: CPT | Performed by: FAMILY MEDICINE

## 2021-12-31 ASSESSMENT — MIFFLIN-ST. JEOR: SCORE: 1811.32

## 2021-12-31 NOTE — PROGRESS NOTES
"  ASSESSMENT/PLAN:   (Z00.00) Routine medical exam  (primary encounter diagnosis)  Comment: Generally the patient is doing very well.  We discussed healthy lifestyles, including adequate exercise (3-4 times a week for 20-30 minutes), and a healthy diet.  Patient should return for annual physicals, and we can also see them here as needed.       (F41.1) Generalized anxiety disorder  Comment: He is doing pretty well on this right now.  He does not feel like he needs any medication for this and he manages it well.      (E78.2) Mixed hyperlipidemia  Comment: Can check his cholesterol today and follow-up with him on the results of the become available.  He is not on any medication right now is hoping to avoid so, we can see what his labs look like today.  Plan: Lipid panel reflex to direct LDL Fasting,         Comprehensive metabolic panel            (E55.9) Vitamin D deficiency  Comment:   Plan: Vitamin D Deficiency            (Z12.5) Screening for malignant neoplasm of prostate  Comment:   Plan: Prostate Specific Antigen Screen              Patient has been advised of split billing requirements and indicates understanding: Yes  COUNSELING:   Reviewed preventive health counseling, as reflected in patient instructions       Regular exercise       Healthy diet/nutrition       Immunizations    Vaccinated for: Td             Alcohol Use        Colon cancer screening       Prostate cancer screening    Estimated body mass index is 24.22 kg/m  as calculated from the following:    Height as of 8/3/20: 1.93 m (6' 4\").    Weight as of 6/17/21: 90.3 kg (199 lb).         He reports that he has never smoked. He has never used smokeless tobacco.          SUBJECTIVE:   CC: Sb Shultz is an 51 year old male who presents for preventative health visit.     Patient here today for a full physical.  He has some stable medical problems including anxiety and cholesterol that is been minimally elevated but all of these things are quite stable " right now he has no major concerns or problems to discuss today.    Patient has been advised of split billing requirements and indicates understanding: Yes  Healthy Habits:     Getting at least 3 servings of Calcium per day:  Yes    Bi-annual eye exam:  Yes    Dental care twice a year:  Yes    Sleep apnea or symptoms of sleep apnea:  None    Diet:  Low fat/cholesterol and Vegetarian/vegan    Frequency of exercise:  6-7 days/week    Duration of exercise:  30-45 minutes    Taking medications regularly:  Yes    Medication side effects:  None    PHQ-2 Total Score: 0    Additional concerns today:  No              Today's PHQ-2 Score:   PHQ-2 ( 1999 [employer]) 12/31/2021   Q1: Little interest or pleasure in doing things 0   Q2: Feeling down, depressed or hopeless 0   PHQ-2 Score 0   Q1: Little interest or pleasure in doing things Not at all   Q2: Feeling down, depressed or hopeless Not at all   PHQ-2 Score 0       Abuse: Current or Past(Physical, Sexual or Emotional)- No  Do you feel safe in your environment? Yes        Social History     Tobacco Use     Smoking status: Never Smoker     Smokeless tobacco: Never Used   Substance Use Topics     Alcohol use: Yes     Alcohol/week: 1.0 standard drink     Comment: 1 / month         Alcohol Use 12/31/2021   Prescreen: >3 drinks/day or >7 drinks/week? No       Last PSA:   Prostate Specific Antigen Screen   Date Value Ref Range Status   12/31/2021 1.11 0.00 - 2.50 ug/L Final       Reviewed orders with patient. Reviewed health maintenance and updated orders accordingly - Yes  Labs reviewed in EPIC  BP Readings from Last 3 Encounters:   12/31/21 112/76   06/29/21 115/75   06/17/21 116/68    Wt Readings from Last 3 Encounters:   12/31/21 85.3 kg (188 lb)   06/17/21 90.3 kg (199 lb)   10/23/20 85 kg (187 lb 8 oz)                  Patient Active Problem List   Diagnosis     Vitamin D Deficiency     Mixed hyperlipidemia     TMJ Pain     Generalized anxiety disorder     Ganglion cyst of  right foot     History reviewed. No pertinent surgical history.    Social History     Tobacco Use     Smoking status: Never Smoker     Smokeless tobacco: Never Used   Substance Use Topics     Alcohol use: Yes     Alcohol/week: 1.0 standard drink     Comment: 1 / month     Family History   Problem Relation Age of Onset     Breast Cancer Mother      Colon Cancer Father      Heart Disease Father      Diabetes Father      Snoring Father      Diabetes Brother          Current Outpatient Medications   Medication Sig Dispense Refill     ascorbic acid, vitamin C, (VITAMIN C) 100 MG tablet [ASCORBIC ACID, VITAMIN C, (VITAMIN C) 100 MG TABLET] Take 100 mg by mouth daily.       carboxymethylcellulose (REFRESH PLUS) 0.5 % Dpet ophthalmic dropperette [CARBOXYMETHYLCELLULOSE (REFRESH PLUS) 0.5 % DPET OPHTHALMIC DROPPERETTE]        cholecalciferol, vitamin D3, 1,000 unit capsule [CHOLECALCIFEROL, VITAMIN D3, 1,000 UNIT CAPSULE] Take 1,000 Units by mouth daily.       mometasone (ELOCON) 0.1 % cream [MOMETASONE (ELOCON) 0.1 % CREAM] APPLY TO AFFECTED AREA TWICE A DAY AS DIRECTED APPLY SPARINGLY TO CLEAN SKIN       No Known Allergies    Reviewed and updated as needed this visit by clinical staff  Tobacco  Allergies  Meds  Problems  Med Hx  Surg Hx  Fam Hx  Soc Hx         Reviewed and updated as needed this visit by Provider  Tobacco  Allergies  Meds  Problems  Med Hx  Surg Hx  Fam Hx  Soc Hx        Past Medical History:   Diagnosis Date     Anxiety       History reviewed. No pertinent surgical history.    Review of Systems  CONSTITUTIONAL: NEGATIVE for fever, chills, change in weight  INTEGUMENTARY/SKIN: NEGATIVE for worrisome rashes, moles or lesions  EYES: NEGATIVE for vision changes or irritation  ENT: NEGATIVE for ear, mouth and throat problems  RESP: NEGATIVE for significant cough or SOB  CV: NEGATIVE for chest pain, palpitations or peripheral edema  GI: NEGATIVE for nausea, abdominal pain, heartburn, or change in  "bowel habits   male: negative for dysuria, hematuria, decreased urinary stream, erectile dysfunction, urethral discharge  MUSCULOSKELETAL: NEGATIVE for significant arthralgias or myalgia  NEURO: NEGATIVE for weakness, dizziness or paresthesias  PSYCHIATRIC: NEGATIVE for changes in mood or affect    OBJECTIVE:   /76 (BP Location: Left arm, Patient Position: Sitting, Cuff Size: Adult Large)   Pulse 65   Ht 1.918 m (6' 3.5\")   Wt 85.3 kg (188 lb)   SpO2 97%   BMI 23.19 kg/m      Physical Exam  GENERAL: healthy, alert and no distress  EYES: Eyes grossly normal to inspection, PERRL and conjunctivae and sclerae normal  HENT: ear canals and TM's normal, nose and mouth without ulcers or lesions  NECK: no adenopathy, no asymmetry, masses, or scars and thyroid normal to palpation  RESP: lungs clear to auscultation - no rales, rhonchi or wheezes  CV: regular rate and rhythm, normal S1 S2, no S3 or S4, no murmur, click or rub, no peripheral edema and peripheral pulses strong  ABDOMEN: soft, nontender, no hepatosplenomegaly, no masses and bowel sounds normal  MS: no gross musculoskeletal defects noted, no edema  SKIN: no suspicious lesions or rashes  NEURO: Normal strength and tone, mentation intact and speech normal  PSYCH: mentation appears normal, affect normal/bright    Diagnostic Test Results:  Labs reviewed in Epic  Results for orders placed or performed in visit on 12/31/21   Lipid panel reflex to direct LDL Fasting     Status: Abnormal   Result Value Ref Range    Cholesterol 203 (H) <=199 mg/dL    Triglycerides 66 <=149 mg/dL    Direct Measure HDL 60 >=40 mg/dL    LDL Cholesterol Calculated 130 (H) <=129 mg/dL    Patient Fasting > 8hrs? Yes    Comprehensive metabolic panel     Status: Normal   Result Value Ref Range    Sodium 141 136 - 145 mmol/L    Potassium 4.2 3.5 - 5.0 mmol/L    Chloride 104 98 - 107 mmol/L    Carbon Dioxide (CO2) 26 22 - 31 mmol/L    Anion Gap 11 5 - 18 mmol/L    Urea Nitrogen 10 8 - 22 " mg/dL    Creatinine 0.93 0.70 - 1.30 mg/dL    Calcium 9.5 8.5 - 10.5 mg/dL    Glucose 93 70 - 125 mg/dL    Alkaline Phosphatase 63 45 - 120 U/L    AST 36 0 - 40 U/L    ALT 41 0 - 45 U/L    Protein Total 7.4 6.0 - 8.0 g/dL    Albumin 4.2 3.5 - 5.0 g/dL    Bilirubin Total 0.9 0.0 - 1.0 mg/dL    GFR Estimate >90 >60 mL/min/1.73m2   Prostate Specific Antigen Screen     Status: Normal   Result Value Ref Range    Prostate Specific Antigen Screen 1.11 0.00 - 2.50 ug/L    Narrative    Assay Method is Abbott Prostate-Specific Antigen (PSA)  Standard-WHO 1st International (90:10)   Vitamin D Deficiency     Status: Normal   Result Value Ref Range    Vitamin D, Total (25-Hydroxy) 47 30 - 80 ug/L    Narrative    Deficiency <10.0 ug/L  Insufficiency 10.0-29.9 ug/L  Sufficiency 30.0-80.0 ug/L  Toxicity (possible) >100.0 ug/L        Chava Wills MD  [facility]

## 2022-01-03 LAB — DEPRECATED CALCIDIOL+CALCIFEROL SERPL-MC: 47 UG/L (ref 30–80)

## 2022-02-18 ENCOUNTER — MYC MEDICAL ADVICE (OUTPATIENT)
Dept: FAMILY MEDICINE | Facility: CLINIC | Age: 52
End: 2022-02-18
Payer: COMMERCIAL

## 2022-02-18 DIAGNOSIS — M25.519 SHOULDER PAIN, UNSPECIFIED CHRONICITY, UNSPECIFIED LATERALITY: Primary | ICD-10-CM

## 2022-02-28 ENCOUNTER — THERAPY VISIT (OUTPATIENT)
Dept: PHYSICAL THERAPY | Facility: CLINIC | Age: 52
End: 2022-02-28
Attending: FAMILY MEDICINE
Payer: COMMERCIAL

## 2022-02-28 DIAGNOSIS — M25.511 ACUTE PAIN OF RIGHT SHOULDER: ICD-10-CM

## 2022-02-28 DIAGNOSIS — M25.519 SHOULDER PAIN, UNSPECIFIED CHRONICITY, UNSPECIFIED LATERALITY: ICD-10-CM

## 2022-02-28 PROCEDURE — 97161 PT EVAL LOW COMPLEX 20 MIN: CPT | Mod: GP | Performed by: PHYSICAL THERAPIST

## 2022-02-28 PROCEDURE — 97110 THERAPEUTIC EXERCISES: CPT | Mod: GP | Performed by: PHYSICAL THERAPIST

## 2022-02-28 NOTE — PROGRESS NOTES
Physical Therapy Initial Evaluation  Subjective:  The history is provided by the patient. No  was used.   Patient Health History  Sb Shultz being seen for R shoulder pain.     Date of Onset: 5 weeks ago.   Problem occurred: unknown   Pain is reported as 2/10 on pain scale.  General health as reported by patient is excellent.  Pertinent medical history includes: none.   Red flags:  None as reported by patient.  Medical allergies: none.   Surgeries include:  None.    Current medications:  None.    Current occupation is .   Primary job tasks include:  Computer work.                  Therapist Generated HPI Evaluation  Problem details: Pt. complains of right shoulder pain that has been present for 5 weeks.  No known trauma.  PT order dated 2/24/22.      .         Type of problem:  Right shoulder.    This is a new condition.  Condition occurred with:  Unknown cause.  Where condition occurred: for unknown reasons.  Patient reports pain:  Anterior.  Pain is described as aching and is intermittent.  Pain radiates to:  Shoulder. Pain is worse during the day.  Since onset symptoms are unchanged.  Associated symptoms:  Loss of motion/stiffness. Symptoms are exacerbated by using arm behind back and using arm overhead  and relieved by rest.      Restrictions due to condition include:  Working in normal job without restrictions.  Barriers include:  None as reported by patient.                        Objective:  Standing Alignment:    Cervical/Thoracic:  Normal  Shoulder/UE:  Normal                  Flexibility/Screens:   Positive screens:  ShoulderNegative screens: Cervical                              Shoulder Evaluation:  ROM:  AROM:  normal                            PROM:  normal                            Pain: with combined ext/IR on right    Strength:  normal                      Stability Testing:  normal      Special Tests:      Left shoulder negative for the following special  tests:  Impingement; Rotator cuff tear and Acromioclavicular    Right shoulder negative for the following special tests:Impingement; Rotator cuff tear and Acrimioclavicular  Palpation:  normal      Mobility Tests:    Glenohumeral anterior left:  Normal  Glenohumeral anterior right:  Normal  Glenohumeral posterior left:  Normal  Glenohumeral posterior right:  Hypomobile  Glenohumeral inferior left:  Normal  Glenohumeral inferior right:  Normal                                             General     ROS    Assessment/Plan:    Patient is a 51 year old male with right side shoulder complaints.    Patient has the following significant findings with corresponding treatment plan.                Diagnosis 1:  R shoulder pain  Pain -  self management, education, directional preference exercise and home program  Decreased ROM/flexibility - manual therapy and therapeutic exercise  Decreased strength - therapeutic exercise and therapeutic activities  Impaired muscle performance - neuro re-education  Decreased function - therapeutic activities    Therapy Evaluation Codes:     1) Clinical presentation characteristics are:   Stable/Uncomplicated.  2) Decision-Making    Low complexity using standardized patient assessment instrument and/or measureable assessment of functional outcome.  Cumulative Therapy Evaluation is: Low complexity.    Previous and current functional limitations:  (See Goal Flow Sheet for this information)    Short term and Long term goals: (See Goal Flow Sheet for this information)     Communication ability:  Patient appears to be able to clearly communicate and understand verbal and written communication and follow directions correctly.  Treatment Explanation - The following has been discussed with the patient:   RX ordered/plan of care  Anticipated outcomes  Possible risks and side effects  This patient would benefit from PT intervention to resume normal activities.   Rehab potential is good.    Frequency:  1  X week, once daily  Duration:  for 6 weeks  Discharge Plan:  Achieve all LTG.  Independent in home treatment program.  Reach maximal therapeutic benefit.    Please refer to the daily flowsheet for treatment today, total treatment time and time spent performing 1:1 timed codes.

## 2022-03-21 ENCOUNTER — THERAPY VISIT (OUTPATIENT)
Dept: PHYSICAL THERAPY | Facility: CLINIC | Age: 52
End: 2022-03-21
Payer: COMMERCIAL

## 2022-03-21 DIAGNOSIS — M25.511 ACUTE PAIN OF RIGHT SHOULDER: ICD-10-CM

## 2022-03-21 PROCEDURE — 97110 THERAPEUTIC EXERCISES: CPT | Mod: GP | Performed by: PHYSICAL THERAPIST

## 2022-03-21 NOTE — PROGRESS NOTES
DISCHARGE REPORT    Progress reporting period is from eval to 3/21/22.       SUBJECTIVE  Subjective changes noted by patient:  .  Subjective: Functioning at 70% of where he wants to be.  Confident continueing independently with HEP    Current pain level is  Current Pain level: 2/10.     Previous pain level was   Initial Pain level: 4/10.   Changes in function:  Yes (See Goal flowsheet attached for changes in current functional level)  Adverse reaction to treatment or activity: None    OBJECTIVE  Changes noted in objective findings:  Yes,   Objective: AROM WNL.  Strength 4+/5 generally.     ASSESSMENT/PLAN  Updated problem list and treatment plan: Diagnosis 1:  R shoulder pain  Impaired muscle performance - neuro re-education  Decreased function - therapeutic activities  STG/LTGs have been met or progress has been made towards goals:  Yes (See Goal flow sheet completed today.)  Assessment of Progress: The patient's condition is improving.  The patient's condition has potential to improve.  Self Management Plans:  Patient has been instructed in a home treatment program.  Patient is independent in a home treatment program.  Patient  has been instructed in self management of symptoms.  I have re-evaluated this patient and find that the nature, scope, duration and intensity of the therapy is appropriate for the medical condition of the patient.      Recommendations:  This patient is ready to be discharged from therapy and continue their home treatment program.    Please refer to the daily flowsheet for treatment today, total treatment time and time spent performing 1:1 timed codes.

## 2022-05-19 ENCOUNTER — OFFICE VISIT (OUTPATIENT)
Dept: FAMILY MEDICINE | Facility: CLINIC | Age: 52
End: 2022-05-19
Payer: COMMERCIAL

## 2022-05-19 VITALS
BODY MASS INDEX: 24.64 KG/M2 | WEIGHT: 199.8 LBS | DIASTOLIC BLOOD PRESSURE: 70 MMHG | HEART RATE: 70 BPM | OXYGEN SATURATION: 98 % | TEMPERATURE: 98.4 F | SYSTOLIC BLOOD PRESSURE: 112 MMHG

## 2022-05-19 DIAGNOSIS — M79.18 BUTTOCK PAIN: Primary | ICD-10-CM

## 2022-05-19 PROCEDURE — 99213 OFFICE O/P EST LOW 20 MIN: CPT | Performed by: FAMILY MEDICINE

## 2022-05-24 NOTE — PROGRESS NOTES
Assessment & Plan     Buttock pain    I reassured him that it is anything of concern today.  He can use some ibuprofen or Tylenol, and consider some heat and stretching to the area and if he is not getting any better than he will let us know.                   No follow-ups on file.    Chava Wills MD  [facility] EVER Velasco is a 51 year old who presents for the following health issues     HPI       Patient is in today with some concerns about pain in the left buttock.  He does not really know what might of started it.  He has had no swelling or redness.  It seems to be muscular.  He has tried some ibuprofen and it helped minimally.  He has not tried ice or heat on it.    Review of Systems   Constitutional, HEENT, cardiovascular, pulmonary, gi and gu systems are negative, except as otherwise noted.      Objective    /70 (BP Location: Left arm, Patient Position: Sitting, Cuff Size: Adult Large)   Pulse 70   Temp 98.4  F (36.9  C) (Oral)   Wt 90.6 kg (199 lb 12.8 oz)   SpO2 98%   BMI 24.64 kg/m    Body mass index is 24.64 kg/m .  Physical Exam   GENERAL: healthy, alert and no distress  NECK: no adenopathy, no asymmetry, masses, or scars and thyroid normal to palpation  RESP: lungs clear to auscultation - no rales, rhonchi or wheezes  CV: regular rate and rhythm, normal S1 S2, no S3 or S4, no murmur, click or rub, no peripheral edema and peripheral pulses strong  ABDOMEN: soft, nontender, no hepatosplenomegaly, no masses and bowel sounds normal  MS: no gross musculoskeletal defects noted, no edema

## 2022-07-07 ENCOUNTER — THERAPY VISIT (OUTPATIENT)
Dept: PHYSICAL THERAPY | Facility: CLINIC | Age: 52
End: 2022-07-07
Payer: COMMERCIAL

## 2022-07-07 DIAGNOSIS — M25.519 SHOULDER PAIN, UNSPECIFIED CHRONICITY, UNSPECIFIED LATERALITY: Primary | ICD-10-CM

## 2022-07-07 PROCEDURE — 97110 THERAPEUTIC EXERCISES: CPT | Mod: GP | Performed by: PHYSICAL THERAPIST

## 2022-07-07 NOTE — PROGRESS NOTES
DISCHARGE REPORT    Progress reporting period is from eval to 7/7/22.       SUBJECTIVE  Subjective changes noted by patient:  .  Subjective: Pt returns to therapy to review HEP and address stiffness reaching into back seat of car.    Current pain level is  Current Pain level: 1/10.     Previous pain level was   Initial Pain level: 4/10.   Changes in function:  Yes (See Goal flowsheet attached for changes in current functional level)  Adverse reaction to treatment or activity: None    OBJECTIVE  Changes noted in objective findings:  Yes,   Objective: AROM WNL.  Strength 5/5     ASSESSMENT/PLAN  Updated problem list and treatment plan: Diagnosis 1:  R shoulder tightness  Decreased ROM/flexibility - manual therapy and therapeutic exercise  STG/LTGs have been met or progress has been made towards goals:  Yes (See Goal flow sheet completed today.)  Assessment of Progress: The patient's condition is improving.  The patient's condition has potential to improve.  The patient has met all of their long term goals.  Self Management Plans:  Patient has been instructed in a home treatment program.  Patient is independent in a home treatment program.  Patient  has been instructed in self management of symptoms.  I have re-evaluated this patient and find that the nature, scope, duration and intensity of the therapy is appropriate for the medical condition of the patient.      Recommendations:  This patient is ready to be discharged from therapy and continue their home treatment program.    Please refer to the daily flowsheet for treatment today, total treatment time and time spent performing 1:1 timed codes.

## 2022-10-02 ENCOUNTER — HEALTH MAINTENANCE LETTER (OUTPATIENT)
Age: 52
End: 2022-10-02

## 2023-01-17 ASSESSMENT — ENCOUNTER SYMPTOMS
PALPITATIONS: 0
DIZZINESS: 0
MYALGIAS: 0
HEMATOCHEZIA: 0
SORE THROAT: 0
PARESTHESIAS: 0
CHILLS: 0
HEARTBURN: 0
WEAKNESS: 0
HEADACHES: 0
COUGH: 0
CONSTIPATION: 0
NAUSEA: 0
FEVER: 0
JOINT SWELLING: 0
SHORTNESS OF BREATH: 0
FREQUENCY: 0
ABDOMINAL PAIN: 0
DIARRHEA: 0
DYSURIA: 0
ARTHRALGIAS: 0
HEMATURIA: 0
EYE PAIN: 0
NERVOUS/ANXIOUS: 0

## 2023-01-18 ENCOUNTER — OFFICE VISIT (OUTPATIENT)
Dept: FAMILY MEDICINE | Facility: CLINIC | Age: 53
End: 2023-01-18
Payer: COMMERCIAL

## 2023-01-18 VITALS
SYSTOLIC BLOOD PRESSURE: 124 MMHG | BODY MASS INDEX: 23.38 KG/M2 | DIASTOLIC BLOOD PRESSURE: 70 MMHG | WEIGHT: 192.02 LBS | HEART RATE: 62 BPM | TEMPERATURE: 97.9 F | HEIGHT: 76 IN | OXYGEN SATURATION: 99 %

## 2023-01-18 DIAGNOSIS — M54.50 CHRONIC BILATERAL LOW BACK PAIN WITHOUT SCIATICA: ICD-10-CM

## 2023-01-18 DIAGNOSIS — H81.13 BENIGN PAROXYSMAL POSITIONAL VERTIGO DUE TO BILATERAL VESTIBULAR DISORDER: ICD-10-CM

## 2023-01-18 DIAGNOSIS — Z11.4 SCREENING FOR HIV (HUMAN IMMUNODEFICIENCY VIRUS): ICD-10-CM

## 2023-01-18 DIAGNOSIS — M27.9 DISEASE OF JAW: ICD-10-CM

## 2023-01-18 DIAGNOSIS — M67.471 GANGLION CYST OF RIGHT FOOT: ICD-10-CM

## 2023-01-18 DIAGNOSIS — E55.9 VITAMIN D DEFICIENCY: ICD-10-CM

## 2023-01-18 DIAGNOSIS — Z12.5 SCREENING FOR PROSTATE CANCER: ICD-10-CM

## 2023-01-18 DIAGNOSIS — Z00.00 HEALTHCARE MAINTENANCE: ICD-10-CM

## 2023-01-18 DIAGNOSIS — G89.29 CHRONIC BILATERAL LOW BACK PAIN WITHOUT SCIATICA: ICD-10-CM

## 2023-01-18 DIAGNOSIS — F41.1 GENERALIZED ANXIETY DISORDER: Chronic | ICD-10-CM

## 2023-01-18 DIAGNOSIS — L20.89 OTHER ATOPIC DERMATITIS: ICD-10-CM

## 2023-01-18 DIAGNOSIS — Z11.59 NEED FOR HEPATITIS C SCREENING TEST: ICD-10-CM

## 2023-01-18 DIAGNOSIS — Z00.00 ANNUAL PHYSICAL EXAM: Primary | ICD-10-CM

## 2023-01-18 DIAGNOSIS — N50.811 RIGHT TESTICULAR PAIN: ICD-10-CM

## 2023-01-18 LAB
ALBUMIN SERPL BCG-MCNC: 4.5 G/DL (ref 3.5–5.2)
ALP SERPL-CCNC: 66 U/L (ref 40–129)
ALT SERPL W P-5'-P-CCNC: 26 U/L (ref 10–50)
ANION GAP SERPL CALCULATED.3IONS-SCNC: 11 MMOL/L (ref 7–15)
AST SERPL W P-5'-P-CCNC: 28 U/L (ref 10–50)
BILIRUB SERPL-MCNC: 0.6 MG/DL
BUN SERPL-MCNC: 11.8 MG/DL (ref 6–20)
CALCIUM SERPL-MCNC: 9.6 MG/DL (ref 8.6–10)
CHLORIDE SERPL-SCNC: 105 MMOL/L (ref 98–107)
CHOLEST SERPL-MCNC: 207 MG/DL
CREAT SERPL-MCNC: 0.87 MG/DL (ref 0.67–1.17)
DEPRECATED HCO3 PLAS-SCNC: 25 MMOL/L (ref 22–29)
ERYTHROCYTE [DISTWIDTH] IN BLOOD BY AUTOMATED COUNT: 12.4 % (ref 10–15)
GFR SERPL CREATININE-BSD FRML MDRD: >90 ML/MIN/1.73M2
GLUCOSE SERPL-MCNC: 108 MG/DL (ref 70–99)
HCT VFR BLD AUTO: 44.3 % (ref 40–53)
HDLC SERPL-MCNC: 58 MG/DL
HGB BLD-MCNC: 15.3 G/DL (ref 13.3–17.7)
LDLC SERPL CALC-MCNC: 136 MG/DL
MCH RBC QN AUTO: 30.7 PG (ref 26.5–33)
MCHC RBC AUTO-ENTMCNC: 34.5 G/DL (ref 31.5–36.5)
MCV RBC AUTO: 89 FL (ref 78–100)
NONHDLC SERPL-MCNC: 149 MG/DL
PLATELET # BLD AUTO: 177 10E3/UL (ref 150–450)
POTASSIUM SERPL-SCNC: 4.2 MMOL/L (ref 3.4–5.3)
PROT SERPL-MCNC: 7.3 G/DL (ref 6.4–8.3)
PSA SERPL-MCNC: 0.94 NG/ML (ref 0–3.5)
RBC # BLD AUTO: 4.99 10E6/UL (ref 4.4–5.9)
SODIUM SERPL-SCNC: 141 MMOL/L (ref 136–145)
TRIGL SERPL-MCNC: 67 MG/DL
WBC # BLD AUTO: 3.6 10E3/UL (ref 4–11)

## 2023-01-18 PROCEDURE — 80053 COMPREHEN METABOLIC PANEL: CPT | Performed by: STUDENT IN AN ORGANIZED HEALTH CARE EDUCATION/TRAINING PROGRAM

## 2023-01-18 PROCEDURE — 99396 PREV VISIT EST AGE 40-64: CPT | Performed by: STUDENT IN AN ORGANIZED HEALTH CARE EDUCATION/TRAINING PROGRAM

## 2023-01-18 PROCEDURE — 85027 COMPLETE CBC AUTOMATED: CPT | Performed by: STUDENT IN AN ORGANIZED HEALTH CARE EDUCATION/TRAINING PROGRAM

## 2023-01-18 PROCEDURE — 36415 COLL VENOUS BLD VENIPUNCTURE: CPT | Performed by: STUDENT IN AN ORGANIZED HEALTH CARE EDUCATION/TRAINING PROGRAM

## 2023-01-18 PROCEDURE — 80061 LIPID PANEL: CPT | Performed by: STUDENT IN AN ORGANIZED HEALTH CARE EDUCATION/TRAINING PROGRAM

## 2023-01-18 PROCEDURE — G0103 PSA SCREENING: HCPCS | Performed by: STUDENT IN AN ORGANIZED HEALTH CARE EDUCATION/TRAINING PROGRAM

## 2023-01-18 ASSESSMENT — PAIN SCALES - GENERAL: PAINLEVEL: NO PAIN (0)

## 2023-01-18 ASSESSMENT — PATIENT HEALTH QUESTIONNAIRE - PHQ9: SUM OF ALL RESPONSES TO PHQ QUESTIONS 1-9: 0

## 2023-01-18 NOTE — PROGRESS NOTES
Assessment/ Plan     52-year-old male with past medical history of generalized anxiety disorder and atopic dermatitis who presents for annual physical and establishment of care.    1. Screening for HIV (human immunodeficiency virus)  2. Need for hepatitis C screening test  Patient refused due to low risk    3. Benign paroxysmal positional vertigo due to bilateral vestibular disorder  Positional, intermittent. Usually with laying down.  Goes away with time. Dos exercises to control.     4. TMJ Pain  On left. Occasional    5. Other atopic dermatitis  On hands. Uses mometasone cream as needed. occasionally  On buttocks as well.     6. Right testicular pain  Due varicocele. US 2017:    IMPRESSION:  CONCLUSION:  1.  Normal-appearing testicles.  2.  Mild right varicocele.    7. Generalized anxiety disorder  Occasional. Has seen a therapist in the past. Has had palpitations in the past.     CHRISTINA-7 SCORE 8/3/2020   Total Score 1     Had a holter monitor 2019 with results as below:  Impression:    Essentially normal multi-day patient activated monitor.    The patient recorded episodes of symptomatic palpitations. It is the reader's impression that the rare ectopy manifest on only half of the symptomatic recordings is the cause for the patient's symptoms.  No other pathologic rhythm disturbance was   demonstrated.    No sustained atrial or ventricular tachyarrhythmia    No profound bradycardia or pauses.    Recording quality is adequate.    8. Vitamin D deficiency  On vitamin D supplement    9. Ganglion cyst of right foot  Bothers him with tight shoes    10. Chronic bilateral low back pain without sciatica  Went through physical therapy and does excercises which helps.     11. Annual physical exam  - Comprehensive metabolic panel (BMP + Alb, Alk Phos, ALT, AST, Total. Bili, TP); Future  - CBC with platelets; Future  - Lipid panel reflex to direct LDL Fasting; Future    12. Screening for prostate cancer  - PSA, screen;  Future    Follow-up in: 1 year for physical    Juarez Alberto MD    Subjective:     Sb Shultz is a 52 year old male who presents for an annual exam.     Chief Complaint   Patient presents with     Physical     Cerumen Impaction     Establish Care       Colonoscopy: 11/17/- repeat in 10 years  PSA:  Prostate Specific Antigen Screen   Date Value Ref Range Status   12/31/2021 1.11 0.00 - 2.50 ug/L Final     Answers for HPI/ROS submitted by the patient on 1/17/2023  Frequency of exercise:: 6-7 days/week  Getting at least 3 servings of Calcium per day:: NO  Diet:: Low fat/cholesterol  Taking medications regularly:: Yes  Medication side effects:: None  Bi-annual eye exam:: Yes  Dental care twice a year:: Yes  Sleep apnea or symptoms of sleep apnea:: None  abdominal pain: No  Blood in stool: No  Blood in urine: No  chest pain: No  chills: No  congestion: No  constipation: No  cough: No  diarrhea: No  dizziness: No  ear pain: No  eye pain: No  nervous/anxious: No  fever: No  frequency: No  genital sores: No  headaches: No  hearing loss: No  heartburn: No  arthralgias: No  joint swelling: No  peripheral edema: No  mood changes: No  myalgias: No  nausea: No  dysuria: No  palpitations: No  Skin sensation changes: No  sore throat: No  urgency: No  rash: No  shortness of breath: No  visual disturbance: No  weakness: No  impotence: No  penile discharge: No  Additional concerns today:: Yes  Duration of exercise:: 30-45 minutes    Immunization History   Administered Date(s) Administered     COVID-19 Vaccine 12+ (Pfizer) 03/31/2021, 04/21/2021, 10/21/2021     COVID-19 Vaccine Bivalent Booster 12+ (Pfizer) 09/09/2022     DT (PEDS <7y) 01/01/1998     FLU 6-35 months 10/04/2010, 10/17/2013     Flu, Unspecified 10/15/2014     HepA, Unspecified 12/06/2011, 01/08/2013     HepA-Adult 12/06/2011, 01/08/2013     Influenza (H1N1) 01/13/2010     Influenza (IIV3) PF 02/01/2007, 10/20/2011     Influenza Vaccine >6 months (Alfuria,Fluzone)  09/22/2021, 10/20/2022     Influenza Vaccine, 6+MO IM (QUADRIVALENT W/PRESERVATIVES) 01/13/2010, 10/04/2010, 10/15/2014, 10/15/2015, 10/29/2016, 09/29/2017     Influenza,INJ,MDCK,PF,Quad >6mo(Flucelvax) 10/18/2018, 10/27/2019, 09/26/2020     TD (ADULT, 7+) 12/31/2021     Tdap (Adacel,Boostrix) 12/06/2011     Immunization status: due today.     Current Outpatient Medications   Medication Sig Dispense Refill     ascorbic acid, vitamin C, (VITAMIN C) 100 MG tablet [ASCORBIC ACID, VITAMIN C, (VITAMIN C) 100 MG TABLET] Take 100 mg by mouth daily.       carboxymethylcellulose (REFRESH PLUS) 0.5 % Dpet ophthalmic dropperette [CARBOXYMETHYLCELLULOSE (REFRESH PLUS) 0.5 % DPET OPHTHALMIC DROPPERETTE]        cholecalciferol, vitamin D3, 1,000 unit capsule [CHOLECALCIFEROL, VITAMIN D3, 1,000 UNIT CAPSULE] Take 1,000 Units by mouth daily.       mometasone (ELOCON) 0.1 % cream [MOMETASONE (ELOCON) 0.1 % CREAM] APPLY TO AFFECTED AREA TWICE A DAY AS DIRECTED APPLY SPARINGLY TO CLEAN SKIN       Past Medical History:   Diagnosis Date     Anxiety      No past surgical history on file.  Patient has no known allergies.  Family History   Problem Relation Age of Onset     Breast Cancer Mother      Colon Cancer Father      Heart Disease Father      Diabetes Father      Snoring Father      Diabetes Brother      Social History     Socioeconomic History     Marital status:      Spouse name: Not on file     Number of children: Not on file     Years of education: Not on file     Highest education level: Not on file   Occupational History     Not on file   Tobacco Use     Smoking status: Never     Smokeless tobacco: Never   Substance and Sexual Activity     Alcohol use: Yes     Alcohol/week: 1.0 standard drink     Comment: 1 / month     Drug use: Never     Sexual activity: Yes     Partners: Female   Other Topics Concern     Not on file   Social History Narrative     Not on file     Social Determinants of Health     Financial Resource  "Strain: Not on file   Food Insecurity: Not on file   Transportation Needs: Not on file   Physical Activity: Not on file   Stress: Not on file   Social Connections: Not on file   Intimate Partner Violence: Not on file   Housing Stability: Not on file       Review of Systems  Complete ROS negative except as noted in the HPI    Objective:      Vitals:    01/18/23 0720   BP: 124/70   Pulse: 62   Temp: 97.9  F (36.6  C)   SpO2: 99%   Weight: 87.1 kg (192 lb 0.3 oz)   Height: 1.918 m (6' 3.5\")       General appearance: Alert, cooperative, no distress, appears stated age  Head: Normocephalic, atraumatic, without obvious abnormality  EARS: TM's gray dull with structures seen bilaterally  Eyes: Pupils equal round, reactive.  Conjunctiva clear.  Nose: Nares normal, no drainage.  Throat: Lips, mucosa, tongue normal mucosa pink and moist  Neck: Supple, symmetric, trachea midline, no adenopathy.  No thyroid enlargement, tenderness or nodules.    Lungs: Clear to auscultation bilaterally, no wheezing or crackles present.  Respirations unlabored  Heart: Regular rate and rhythm, normal S1 and S2, no murmur, rub or gallop.  Abdomen: Soft, nontender, nondistended.  Bowel sounds active in all 4 quadrants.  No masses or organomegaly.  Extremities: Extremities normal, atraumatic.  No cyanosis or edema.  Skin: Skin color, texture, turgor normal no rashes or lesions on limited skin exam  Neurologic: CN II through XII intact, normal strength.  URO: normal appearance of testicles with no swelling, redness, or rash noted, testicles and epididymis nontender to palpation with no nodules felt, varicocele not felt, no hernia bilaterally. Penis is normal appearing with no redness, swelling, or rash. No pain or discharge on palpation      Juarez Short MD    "

## 2023-01-20 NOTE — RESULT ENCOUNTER NOTE
Rod,  Your results from your recent clinic visit show:  Your CMP was normal with normal electrolytes, kidney function, and liver function  Your lipids look ok and I used these as well as other factors from your history to calculate your 10 year risk of having something like a heart attack and it was low risk at 2.9%. Just continue to work on exercise and diet to maintain this low risk.  Your PSA, which is a screen for prostate cancer, was normal  Your CBC is normal with no anemia or signs of infection seen    If you have more questions please call the clinic at [phone] or send me a [employer] message    Dr. Juarez Alberto

## 2023-05-30 ENCOUNTER — OFFICE VISIT (OUTPATIENT)
Dept: PODIATRY | Facility: CLINIC | Age: 53
End: 2023-05-30
Payer: COMMERCIAL

## 2023-05-30 VITALS — TEMPERATURE: 97.9 F | WEIGHT: 196.8 LBS | BODY MASS INDEX: 24.27 KG/M2

## 2023-05-30 DIAGNOSIS — M89.8X7 EXOSTOSIS OF RIGHT FOOT: Primary | ICD-10-CM

## 2023-05-30 PROCEDURE — 99213 OFFICE O/P EST LOW 20 MIN: CPT | Performed by: PODIATRIST

## 2023-05-30 NOTE — PROGRESS NOTES
FOOT AND ANKLE SURGERY/PODIATRY PROGRESS NOTE        ASSESSMENT: Exostosis right midfoot      TREATMENT:  -I discussed with the patient that I am unable to detect a discrete soft tissue mass at this location along the dorsal right midfoot and instead have a more firm mass identified which appears to be consistent with an exostosis.    -We discussed surgical removal of the exostosis including postoperative course and risks which include but not limited to infection and neuritis.    -Also reviewed conservative treatment options including alternating lacing of the shoes/boots.    -Reviewed obtaining an MRI for further evaluation of the pathology.  Patient declines but will consider at a future date.    -Patient's questions invited and answered.  He was encouraged to call my office with any further questions or concerns.     Bhavin Darby DPM  [facility] Podiatry/Foot & Ankle Surgery      HPI: Sb Shultz was seen again today for concerns related to a small bony prominence along the dorsal right midfoot.  Patient is known to me having last seen him in 2021 for similar concerns.  At that time we discussed that the prominence was likely a ganglion cyst.  He denies pain with walking but states that he does have some sensitivity which appears to be intermittent.    Past Medical History:   Diagnosis Date     Anxiety        No past surgical history on file.    No Known Allergies      Current Outpatient Medications:      ascorbic acid, vitamin C, (VITAMIN C) 100 MG tablet, [ASCORBIC ACID, VITAMIN C, (VITAMIN C) 100 MG TABLET] Take 100 mg by mouth daily., Disp: , Rfl:      carboxymethylcellulose (REFRESH PLUS) 0.5 % Dpet ophthalmic dropperette, [CARBOXYMETHYLCELLULOSE (REFRESH PLUS) 0.5 % DPET OPHTHALMIC DROPPERETTE] , Disp: , Rfl:      cholecalciferol, vitamin D3, 1,000 unit capsule, [CHOLECALCIFEROL, VITAMIN D3, 1,000 UNIT CAPSULE] Take 1,000 Units by mouth daily., Disp: , Rfl:      mometasone (ELOCON) 0.1 %  cream, [MOMETASONE (ELOCON) 0.1 % CREAM] APPLY TO AFFECTED AREA TWICE A DAY AS DIRECTED APPLY SPARINGLY TO CLEAN SKIN, Disp: , Rfl:     Family History   Problem Relation Age of Onset     Breast Cancer Mother      Colon Cancer Father      Heart Disease Father      Diabetes Father      Snoring Father      Diabetes Brother        Social History     Socioeconomic History     Marital status:      Spouse name: Not on file     Number of children: Not on file     Years of education: Not on file     Highest education level: Not on file   Occupational History     Not on file   Tobacco Use     Smoking status: Never     Smokeless tobacco: Never   Vaping Use     Vaping status: Not on file   Substance and Sexual Activity     Alcohol use: Yes     Alcohol/week: 1.0 standard drink of alcohol     Types: 1 Standard drinks or equivalent per week     Comment: 1 / week     Drug use: Never     Sexual activity: Yes     Partners: Female     Birth control/protection: Post-menopausal     Comment: wife   Other Topics Concern     Not on file   Social History Narrative     Not on file     Social Determinants of Health     Financial Resource Strain: Not on file   Food Insecurity: Not on file   Transportation Needs: Not on file   Physical Activity: Not on file   Stress: Not on file   Social Connections: Not on file   Intimate Partner Violence: Not on file   Housing Stability: Not on file       10 point Review of Systems is negative except for exostosis right midfoot which is noted in HPI.     Temp 97.9  F (36.6  C)   Wt 89.3 kg (196 lb 12.8 oz)   BMI 24.27 kg/m      BMI= Body mass index is 24.27 kg/m .    OBJECTIVE:  General appearance: Patient is alert and fully cooperative with history & exam.  No sign of distress is noted during the visit.    Vascular: Dorsalis pedis and posterior tibial pulses are palpable. There is pedal hair growth right.  CFT < 3 sec from anterior tibial surface to distal digits right. There is no appreciable edema  noted.  Dermatologic: Turgor and texture are within normal limits. No coloration or temperature changes. No primary or secondary lesions noted.  Neurologic: All epicritic and proprioceptive sensations are grossly intact right.  Musculoskeletal: Small bony prominence along the dorsal right midfoot at level of cuneiforms, no pain to palpation.

## 2023-05-30 NOTE — LETTER
5/30/2023         RE: Sb Shultz  [address] [address]        Dear Colleague,    Thank you for referring your patient, Sb Shultz, to the [facility]. Please see a copy of my visit note below.        FOOT AND ANKLE SURGERY/PODIATRY PROGRESS NOTE        ASSESSMENT: Exostosis right midfoot      TREATMENT:  -I discussed with the patient that I am unable to detect a discrete soft tissue mass at this location along the dorsal right midfoot and instead have a more firm mass identified which appears to be consistent with an exostosis.    -We discussed surgical removal of the exostosis including postoperative course and risks which include but not limited to infection and neuritis.    -Also reviewed conservative treatment options including alternating lacing of the shoes/boots.    -Reviewed obtaining an MRI for further evaluation of the pathology.  Patient declines but will consider at a future date.    -Patient's questions invited and answered.  He was encouraged to call my office with any further questions or concerns.     Bhavin Darby DPM  [facility] Podiatry/Foot & Ankle Surgery      HPI: Sb Shultz was seen again today for concerns related to a small bony prominence along the dorsal right midfoot.  Patient is known to me having last seen him in 2021 for similar concerns.  At that time we discussed that the prominence was likely a ganglion cyst.  He denies pain with walking but states that he does have some sensitivity which appears to be intermittent.    Past Medical History:   Diagnosis Date     Anxiety        No past surgical history on file.    No Known Allergies      Current Outpatient Medications:      ascorbic acid, vitamin C, (VITAMIN C) 100 MG tablet, [ASCORBIC ACID, VITAMIN C, (VITAMIN C) 100 MG TABLET] Take 100 mg by mouth daily., Disp: , Rfl:      carboxymethylcellulose (REFRESH PLUS) 0.5 % Dpet ophthalmic dropperette, [CARBOXYMETHYLCELLULOSE  (REFRESH PLUS) 0.5 % DPET OPHTHALMIC DROPPERETTE] , Disp: , Rfl:      cholecalciferol, vitamin D3, 1,000 unit capsule, [CHOLECALCIFEROL, VITAMIN D3, 1,000 UNIT CAPSULE] Take 1,000 Units by mouth daily., Disp: , Rfl:      mometasone (ELOCON) 0.1 % cream, [MOMETASONE (ELOCON) 0.1 % CREAM] APPLY TO AFFECTED AREA TWICE A DAY AS DIRECTED APPLY SPARINGLY TO CLEAN SKIN, Disp: , Rfl:     Family History   Problem Relation Age of Onset     Breast Cancer Mother      Colon Cancer Father      Heart Disease Father      Diabetes Father      Snoring Father      Diabetes Brother        Social History     Socioeconomic History     Marital status:      Spouse name: Not on file     Number of children: Not on file     Years of education: Not on file     Highest education level: Not on file   Occupational History     Not on file   Tobacco Use     Smoking status: Never     Smokeless tobacco: Never   Vaping Use     Vaping status: Not on file   Substance and Sexual Activity     Alcohol use: Yes     Alcohol/week: 1.0 standard drink of alcohol     Types: 1 Standard drinks or equivalent per week     Comment: 1 / week     Drug use: Never     Sexual activity: Yes     Partners: Female     Birth control/protection: Post-menopausal     Comment: wife   Other Topics Concern     Not on file   Social History Narrative     Not on file     Social Determinants of Health     Financial Resource Strain: Not on file   Food Insecurity: Not on file   Transportation Needs: Not on file   Physical Activity: Not on file   Stress: Not on file   Social Connections: Not on file   Intimate Partner Violence: Not on file   Housing Stability: Not on file       10 point Review of Systems is negative except for exostosis right midfoot which is noted in HPI.     Temp 97.9  F (36.6  C)   Wt 89.3 kg (196 lb 12.8 oz)   BMI 24.27 kg/m      BMI= Body mass index is 24.27 kg/m .    OBJECTIVE:  General appearance: Patient is alert and fully cooperative with history & exam.   No sign of distress is noted during the visit.    Vascular: Dorsalis pedis and posterior tibial pulses are palpable. There is pedal hair growth right.  CFT < 3 sec from anterior tibial surface to distal digits right. There is no appreciable edema noted.  Dermatologic: Turgor and texture are within normal limits. No coloration or temperature changes. No primary or secondary lesions noted.  Neurologic: All epicritic and proprioceptive sensations are grossly intact right.  Musculoskeletal: Small bony prominence along the dorsal right midfoot at level of cuneiforms, no pain to palpation.        Again, thank you for allowing me to participate in the care of your patient.        Sincerely,        Bhavin Darby DPM

## 2023-06-28 ENCOUNTER — OFFICE VISIT (OUTPATIENT)
Dept: FAMILY MEDICINE | Facility: CLINIC | Age: 53
End: 2023-06-28
Payer: COMMERCIAL

## 2023-06-28 VITALS
TEMPERATURE: 98.8 F | OXYGEN SATURATION: 96 % | BODY MASS INDEX: 23.93 KG/M2 | WEIGHT: 194 LBS | DIASTOLIC BLOOD PRESSURE: 78 MMHG | SYSTOLIC BLOOD PRESSURE: 110 MMHG | HEART RATE: 94 BPM

## 2023-06-28 DIAGNOSIS — L72.0 EPIDERMAL CYST OF NECK: Primary | ICD-10-CM

## 2023-06-28 DIAGNOSIS — D18.01 CAVERNOUS HEMANGIOMA OF SCALP: ICD-10-CM

## 2023-06-28 PROCEDURE — 99213 OFFICE O/P EST LOW 20 MIN: CPT | Performed by: FAMILY MEDICINE

## 2023-06-28 NOTE — PROGRESS NOTES
Assessment & Plan     Epidermal cyst of neck  Patient has recurrent sebaceous cyst left posterior neck region  - Adult Dermatology Referral    Cavernous hemangioma of scalp  Small hemangioma left occipital region scalp probably amenable to cryotherapy  - Derm referral                   Jez Mcclain MD  [facility] GENEVA Velasco is a 52 year old, presenting for the following health issues:  Mass (On head x 1 month  / Lump on neck )        6/28/2023     8:44 AM   Additional Questions   Roomed by Anna MOBLEY     Rash  Onset/Duration: 1 month ago  Description  Location: top of head. And neck  Character:red and squishy   Itching: mild  Intensity:  mild  Progression of Symptoms:  worsening  Accompanying signs and symptoms:   Fever: No  Body aches or joint pain: No  Sore throat symptoms: No  Recent cold symptoms: No  History:           Previous episodes of similar rash: None  New exposures:  None  Recent travel: No  Exposure to similar rash: No  Precipitating or alleviating factors:   Therapies tried and outcome: none          Review of Systems   Constitutional, HEENT, cardiovascular, pulmonary, gi and gu systems are negative, except as otherwise noted.      Objective    /78   Pulse 94   Temp 98.8  F (37.1  C)   Wt 88 kg (194 lb)   SpO2 96%   BMI 23.93 kg/m    Body mass index is 23.93 kg/m .  Physical Exam   GENERAL: healthy, alert and no distress  NECK: no adenopathy, no asymmetry, masses, or scars and thyroid normal to palpation  RESP: lungs clear to auscultation - no rales, rhonchi or wheezes  CV: regular rate and rhythm, normal S1 S2, no S3 or S4, no murmur, click or rub, no peripheral edema and peripheral pulses strong  ABDOMEN: soft, nontender, no hepatosplenomegaly, no masses and bowel sounds normal  MS: no gross musculoskeletal defects noted, no edema    Skin-patient has a 6 mm hemangioma like mass left posterior occipital scalp and a small sebaceous cyst left lower neck  area posteriorly; dermatology consultation indicated

## 2023-07-14 ENCOUNTER — VIRTUAL VISIT (OUTPATIENT)
Dept: URGENT CARE | Facility: CLINIC | Age: 53
End: 2023-07-14
Payer: COMMERCIAL

## 2023-07-14 ENCOUNTER — NURSE TRIAGE (OUTPATIENT)
Dept: NURSING | Facility: CLINIC | Age: 53
End: 2023-07-14
Payer: COMMERCIAL
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
## 2023-07-14 PROCEDURE — 99213 OFFICE O/P EST LOW 20 MIN: CPT | Mod: 95

## 2023-07-14 RX ORDER — AMOXICILLIN 875 MG
875 TABLET ORAL 2 TIMES DAILY
Qty: 14 TABLET | Refills: 0 | Status: SHIPPED | OUTPATIENT
Start: 2023-07-14 | End: 2023-07-21

## 2023-07-14 ASSESSMENT — ENCOUNTER SYMPTOMS: CONSTITUTIONAL NEGATIVE: 1

## 2023-07-14 NOTE — TELEPHONE ENCOUNTER
Nurse Triage SBAR    Is this a 2nd Level Triage? NO    Situation: Pt calling with concerns for a possible bug bite.    Background: Pt states he noticed what he believes might be a bug bite on his left shoulder a couple of days ago. Today he calls with concerns for a purple line coming out from near it.    Assessment: Pt states the bite is located on his left lower shoulder. There is a [place] around his bicep about 2.5 inches long from near the bug bite site. It does not seem to be attached to the bite. It is tender to touch but not severely. It is purple in color. Denies fever.    Protocol Recommended Disposition:   See PCP Within 3 Days    Recommendation: Dispo to be seen within 3 days. Pt was transferred to scheduling to try to set up a virtual appt this evening.     Reason for Disposition   Bite starts to look bad (e.g., blister, purplish skin, ulcer)  (Exception: There is just minor swelling or small red bump.)    Additional Information   Negative: [1] Life-threatening reaction (anaphylaxis) in the past to bite from same insect AND [2] < 2 hours since bite   Negative: Passed out (i.e., lost consciousness, collapsed and was not responding)   Negative: Difficulty breathing or wheezing   Negative: [1] Hoarseness or cough AND [2] sudden onset following bite   Negative: [1] Difficulty swallowing or slurred speech AND [2] sudden onset following bite   Negative: Sounds like a life-threatening emergency to the triager   Negative: Patient sounds very sick or weak to the triager   Negative: [1] SEVERE bite pain AND [2] not improved after 2 hours of pain medicine   Negative: [1] Fever AND [2] red area   Negative: [1] Fever AND [2] area is very tender to touch   Negative: [1] Red streak or red line AND [2] length > 2 inches (5 cm)   Negative: [1] Red or very tender (to touch) area AND [2] started over 24 hours after the bite   Negative: [1] Red or very tender (to touch) area AND [2] getting larger over 48 hours after the  bite   Negative: No prior tetanus shots (or is not fully vaccinated)   Negative: [1] SEVERE local itching (i.e., interferes with work, school, sleep) AND [2] not improved after 24 hours of hydrocortisone cream   Negative: [1] After 14 days AND [2] insect bite isn't healed   Negative: Last tetanus shot > 10 years ago    Protocols used: Insect Bite-A-    Jazmyne Whitman RN  [facility] Nurse Advisor   7/14/2023  6:40 PM

## 2023-07-15 NOTE — PROGRESS NOTES
Rod is a 52 year old who is being evaluated via a billable video visit.          Assessment & Plan     Bug bite with infection, initial encounter    - amoxicillin (AMOXIL) 875 MG tablet; Take 1 tablet (875 mg) by mouth 2 times daily for 7 days      10 minutes spent by me on the date of the encounter doing patient visit and documentation        See Patient Instructions    Return in about 1 week (around 7/21/2023), or if symptoms worsen or fail to improve.    Virtual Urgent Care  [facility] VIRTUAL [facility]RGENT [facility]    Subjective   Rod is a 52 year old, presenting for the following health issues:  No chief complaint on file.        6/28/2023     8:44 AM   Additional Questions   Roomed by Anna MOBLEY     Bug bite to left bicep that he noticed 2-3 days ago that is red and itchy. Today noticed a red/purple streak coming from it.   No fever, malaise, body aches.       Review of Systems   Constitutional: Negative.    Skin: Positive for rash.            Objective           Vitals:  No vitals were obtained today due to virtual visit.    Physical Exam   GENERAL: Healthy, alert and no distress  SKIN: Viewed on video- solid erythremic patch to left bicep with purple colored tail coming from the patch.             Video-Visit Details    Type of service:  Video Visit     Originating Location (pt. Location): Home  Distant Location (provider location):  Off-site  Platform used for Video Visit: [employer]

## 2023-07-25 ENCOUNTER — OFFICE VISIT (OUTPATIENT)
Dept: FAMILY MEDICINE | Facility: CLINIC | Age: 53
End: 2023-07-25
Payer: COMMERCIAL

## 2023-07-25 ENCOUNTER — NURSE TRIAGE (OUTPATIENT)
Dept: FAMILY MEDICINE | Facility: CLINIC | Age: 53
End: 2023-07-25

## 2023-07-25 VITALS
DIASTOLIC BLOOD PRESSURE: 68 MMHG | WEIGHT: 195 LBS | OXYGEN SATURATION: 97 % | RESPIRATION RATE: 20 BRPM | SYSTOLIC BLOOD PRESSURE: 112 MMHG | HEART RATE: 75 BPM | HEIGHT: 76 IN | TEMPERATURE: 97.9 F | BODY MASS INDEX: 23.75 KG/M2

## 2023-07-25 DIAGNOSIS — R19.7 DIARRHEA, UNSPECIFIED TYPE: Primary | ICD-10-CM

## 2023-07-25 PROCEDURE — 99213 OFFICE O/P EST LOW 20 MIN: CPT | Performed by: NURSE PRACTITIONER

## 2023-07-25 ASSESSMENT — ENCOUNTER SYMPTOMS: BACK PAIN: 1

## 2023-07-25 ASSESSMENT — PAIN SCALES - GENERAL: PAINLEVEL: NO PAIN (0)

## 2023-07-25 NOTE — TELEPHONE ENCOUNTER
Nurse Triage SBAR    Is this a 2nd Level Triage? YES, LICENSED PRACTITIONER REVIEW IS REQUIRED    Situation: Patient calling with persistent diarrhea after antibiotic tx.    Background: [place] [facility] [place] Put antibiotics (7 day course). Completed course on Friday. Patient continues to have 2-3 loose stools per day, now 4 days since antibiotics have stopped. Patient has attempted multiple home treatments to resolve issue. No other symptoms reported.    Assessment: Persistent diarrhea post antibiotics.    Protocol Recommended Disposition:   See PCP Within 3 Days    Recommendation: Recommendation for patient to be seen within 3 days. Transfer to scheduling for appointment option - done.     Appointment scheduled.    Does the patient meet one of the following criteria for ADS visit consideration? 16+ years old, with an [facility] PCP     TIP  Providers, please consider if this condition is appropriate for management at one of our Acute and Diagnostic Services sites.     If patient is a good candidate, please use dotphrase <dot>triageresponse and select Refer to ADS to document.     Reason for Disposition   Diarrhea continues for > 3 days after stopping the antibiotic    Additional Information   Negative: Shock suspected (e.g., cold/pale/clammy skin, too weak to stand, low BP, rapid pulse)   Negative: Difficult to awaken or acting confused (e.g., disoriented, slurred speech)   Negative: Sounds like a life-threatening emergency to the triager   Negative: Vomiting also present and worse than the diarrhea   Negative: [1] Blood in stool AND [2] without diarrhea   Negative: Diarrhea in a cancer patient who is currently (or recently) receiving chemotherapy or radiation therapy, or cancer patient who has metastatic or end-stage cancer and is receiving palliative care   Negative: SEVERE abdominal pain (e.g., excruciating)   Negative: [1] Blood in the stool AND [2] moderate or large amount of blood (e.g., any blood clots,  passing blood without stool, toilet water turns red)   Negative: Black or tarry bowel movements  (Exception: chronic-unchanged black-grey bowel movements AND is taking iron pills or Pepto-Bismol)   Negative: [1] Drinking very little AND [2] dehydration suspected (e.g., no urine > 12 hours, very dry mouth, very lightheaded)   Negative: Patient sounds very sick or weak to the triager   Negative: SEVERE diarrhea (e.g., 7 or more times / day more than normal)   Negative: [1] Constant abdominal pain AND [2] present > 2 hours   Negative: Abdomen looks much more swollen than usual   Negative: MODERATE diarrhea (e.g., 4-6 times / day more than normal)   Negative: [1] Taking antibiotic > 48 hours (2 days) AND [2] fever still present or recurs   Negative: [1] Taking antibiotic AND [2] new-onset of fever   Negative: Tube feedings (e.g., nasogastric, g-tube, j-tube)   Negative: Abdominal pain  (Exception: mild cramping that clears with each passage of diarrhea stool)   Negative: [1] Recent hospitalization or nursing home stay AND [2] diarrhea present > 3 days   Negative: [1] Blood in the stool AND [2] small amount of blood (e.g., few drops or streaks on normal brown stool) (Exception: only on toilet paper. Reason: diarrhea can cause rectal irritation with blood on wiping)   Negative: Pus or mucus in stool   Negative: History of C. diff (e.g., C. diff diarrhea, C. diff colitis)   Negative: Weak immune system (e.g., HIV positive, cancer chemo, splenectomy, organ transplant, chronic steroids)   Negative: Patient wants to stop the antibiotic    Protocols used: Diarrhea on Antibiotics--    Ronald Delcid, RN, BSN, MSN  [facility] Triage 8:27 AM

## 2023-07-25 NOTE — PROGRESS NOTES
"    Ye Velasco is a 52 year old, presenting for the following health issues:  Bowel Problems (Has been having loose and runny stools since the 16th. Recently finished taking a antibiotic for a bite on his left arm. That is still red. Is going 2-3 times a day. ) and Back Pain (Left sided pain that he noticed this morning)        7/25/2023    12:23 PM   Additional Questions   Roomed by Davida GOMEZ CMA     Back Pain     History of Present Illness       Reason for visit:  Lump on head and neck    He eats 4 or more servings of fruits and vegetables daily.He consumes 0 sweetened beverage(s) daily.He exercises with enough effort to increase his heart rate 30 to 60 minutes per day.  He exercises with enough effort to increase his heart rate 6 days per week.   He is taking medications regularly.       Diarrhea  Onset/Duration: x 10 days  Description:       Consistency of stool: runny and explosive       Blood in stool: No       Number of loose stools past 24 hours: 3  Progression of Symptoms: waxing and waning  Accompanying signs and symptoms:       Fever: No       Nausea/Vomiting: No       Abdominal pain: cramping       Weight loss: No       Episodes of constipation: No  History   Ill contacts: YES  Recent use of antibiotics: No  Recent travels: No  Recent medication-new or changes(Rx or OTC): No  Precipitating or alleviating factors: Amoxicillin use  Therapies tried and outcome: diet changes        Review of Systems   Musculoskeletal:  Positive for back pain.    Rash on arm resolved after antibiotic use    Constitutional, HEENT, cardiovascular, pulmonary, gi and gu systems are negative, except as otherwise noted.      Objective    /68 (BP Location: Left arm, Patient Position: Sitting, Cuff Size: Adult Regular)   Pulse 75   Temp 97.9  F (36.6  C) (Oral)   Resp 20   Ht 1.918 m (6' 3.5\")   Wt 88.5 kg (195 lb)   SpO2 97%   BMI 24.05 kg/m    Body mass index is 24.05 kg/m .  Physical Exam   GENERAL: healthy, alert " and no distress  RESP: lungs clear to auscultation - no rales, rhonchi or wheezes  CV: regular rate and rhythm, normal S1 S2, no S3 or S4, no murmur, click or rub, no peripheral edema and peripheral pulses strong  ABDOMEN: soft, nontender, no hepatosplenomegaly, no masses and bowel sounds normal  MS: no gross musculoskeletal defects noted, no edema  SKIN: nearly resolved rash left arm  PSYCH: mentation appears normal, affect normal/bright    Lab Results   Component Value Date    WBC 3.6 01/18/2023     Lab Results   Component Value Date    RBC 4.99 01/18/2023     Lab Results   Component Value Date    HGB 15.3 01/18/2023     Lab Results   Component Value Date    HCT 44.3 01/18/2023     No components found for: MCT  Lab Results   Component Value Date    MCV 89 01/18/2023     Lab Results   Component Value Date    MCH 30.7 01/18/2023     Lab Results   Component Value Date    MCHC 34.5 01/18/2023     Lab Results   Component Value Date    RDW 12.4 01/18/2023     Lab Results   Component Value Date     01/18/2023     A/P:  (R19.7) Diarrhea, unspecified type  (primary encounter diagnosis)  Comment:   Plan: C. difficile Toxin B PCR with reflex to C.         difficile Antigen and Toxins A/B EIA, Enteric         Bacteria and Virus Panel by SHANDRA Stool,         CANCELED: CBC with platelets and differential

## 2023-09-28 ENCOUNTER — NURSE TRIAGE (OUTPATIENT)
Dept: NURSING | Facility: CLINIC | Age: 53
End: 2023-09-28
Payer: COMMERCIAL

## 2023-09-28 NOTE — TELEPHONE ENCOUNTER
Pt seen in the past and discussed back pain and [facility] in [facility].  Pt states similar back pain and was seen in Urgent Care a couple days ago and prescribed Methocarbamol which pt started today.  Pt left work early today due to moderate back pain.  Pt calling requesting Referral to [facility] in [place], [place] as pt insurance needs a referral from pt PCP.  Pt is also willing to be seen if needed, however states was just seen at Urgent Care.    Please advise.  Pt can be reached at [phone]    Thank you  Hawa Pierce RN  [facility] Nurse Advisor      Reason for Disposition   MODERATE back pain (e.g., interferes with normal activities) and present > 3 days    Additional Information   Negative: Passed out (i.e., fainted, collapsed and was not responding)   Negative: Shock suspected (e.g., cold/pale/clammy skin, too weak to stand, low BP, rapid pulse)   Negative: Sounds like a life-threatening emergency to the triager   Negative: Major injury to the back (e.g., MVA, fall > 10 feet or 3 meters, penetrating injury, etc.)   Negative: Pain in the upper back over the ribs (rib cage) that radiates (travels) into the chest   Negative: Pain in the upper back over the ribs (rib cage) and worsened by coughing (or clearly increases with breathing)   Negative: Back pain during pregnancy   Negative: SEVERE back pain of sudden onset and age > 60 years   Negative: SEVERE abdominal pain (e.g., excruciating)   Negative: Abdominal pain and age > 60 years   Negative: Unable to urinate (or only a few drops) and bladder feels very full   Negative: Loss of bladder or bowel control (urine or bowel incontinence; wetting self, leaking stool) of new-onset   Negative: Numbness (loss of sensation) in groin or rectal area   Negative: Pain radiates into groin, scrotum   Negative: Blood in urine (red, pink, or tea-colored)   Negative: Vomiting and pain over lower ribs of back (i.e., flank - kidney area)   Negative: Weakness  of a leg or foot (e.g., unable to bear weight, dragging foot)   Negative: Patient sounds very sick or weak to the triager   Negative: Fever > 100.4 F (38.0 C) and flank pain   Negative: Pain or burning with passing urine (urination)   Negative: SEVERE back pain (e.g., excruciating, unable to do any normal activities) and not improved after pain medicine and CARE ADVICE   Negative: Numbness in an arm or hand (i.e., loss of sensation) and upper back pain   Negative: Numbness in a leg or foot (i.e., loss of sensation)   Negative: High-risk adult (e.g., history of cancer, history of HIV, or history of IV Drug Use)   Negative: Soft tissue infection (e.g., abscess, cellulitis) or other serious infection (e.g., bacteremia) in last 2 weeks   Negative: Painful rash with multiple small blisters grouped together (i.e., dermatomal distribution or 'band' or 'stripe')   Negative: Pain radiates into the thigh or further down the leg, and in both legs   Negative: Age > 50 and no history of prior similar back pain    Protocols used: Back Pain-A-[place]

## 2023-10-03 ENCOUNTER — TELEPHONE (OUTPATIENT)
Dept: FAMILY MEDICINE | Facility: CLINIC | Age: 53
End: 2023-10-03
Payer: COMMERCIAL

## 2023-10-03 NOTE — TELEPHONE ENCOUNTER
Dr Buster VALERA:    Reached out to pt/Rod, went over his request for PT to [facility].     Let Rod know that [facility] is out side the [facility]/A Network. Due to his insurance/[employer] being a referral based product, no insurance referral will be authorized for services to [facility]. Tried to redirect Rod with options with in [place]/A Network, Rod is choosing not to move forward with redirection.    He did bring up many years ago when we were [facility] that he was able to get an out of network approved for services to [facility] and [facility] from there. He asked if that would be an option. I provided him with the out of network process and explained that it is not a guarantee of approval even if he got an approval when we were [facility]. He decided not to move forward with the [facility] and will figure out what his next step will be.    I told Rod I would make note of our conversation and let Dr Short know that no referral order will be needed for PT services.     Insurance: [employer] Advantage  Primary Care Physician: [facility]    Plan(s) ADVANTAGE HP-CT    Plan Dates 01/01/2023 - 12/31/2199    Member ID Code [id number]  Group ID [id number]    Yakelin  [facility] Referral Coordinator

## 2023-12-10 ENCOUNTER — NURSE TRIAGE (OUTPATIENT)
Dept: NURSING | Facility: CLINIC | Age: 53
End: 2023-12-10
Payer: COMMERCIAL

## 2023-12-10 NOTE — TELEPHONE ENCOUNTER
Thur night had mild sore throat, Sat negative COVID, fever present, H/A.   2 COVID tests done this am, Is positive. Small amount of phlegm needing to clear, no nasal discharge, no cough, watery eyes.     Protocol reviewed, home care advice given, call back with worsening symptoms, further questions/concerns.     PANKAJ PICKERING RN  [facility] nurse advisors  12/10/2023  9:30 AM  Reason for Disposition   [1] COVID-19 diagnosed by positive lab test (e.g., PCR, rapid self-test kit) AND [2] mild symptoms (e.g., cough, fever, others) AND [3] no complications or SOB    Additional Information   Negative: SEVERE difficulty breathing (e.g., struggling for each breath, speaks in single words)   Negative: Difficult to awaken or acting confused (e.g., disoriented, slurred speech)   Negative: Bluish (or gray) lips or face now   Negative: Shock suspected (e.g., cold/pale/clammy skin, too weak to stand, low BP, rapid pulse)   Negative: Sounds like a life-threatening emergency to the triager   Negative: [1] Diagnosed or suspected COVID-19 AND [2] symptoms lasting 3 or more weeks   Negative: [1] COVID-19 exposure AND [2] no symptoms   Negative: COVID-19 vaccine reaction suspected (e.g., fever, headache, muscle aches) occurring 1 to 3 days after getting vaccine   Negative: COVID-19 vaccine, questions about   Negative: [1] Lives with someone known to have influenza (flu test positive) AND [2] flu-like symptoms (e.g., cough, runny nose, sore throat, SOB; with or without fever)   Negative: [1] Possible COVID-19 symptoms AND [2] triager concerned about severity of symptoms or other causes   Negative: COVID-19 and breastfeeding, questions about   Negative: SEVERE or constant chest pain or pressure  (Exception: Mild central chest pain, present only when coughing.)   Negative: MODERATE difficulty breathing (e.g., speaks in phrases, SOB even at rest, pulse 100-120)   Negative: [1] Headache AND [2] stiff neck (can't touch chin to  chest)   Negative: Oxygen level (e.g., pulse oximetry) 90 percent or lower   Negative: Chest pain or pressure  (Exception: MILD central chest pain, present only when coughing.)   Negative: [1] Drinking very little AND [2] dehydration suspected (e.g., no urine > 12 hours, very dry mouth, very lightheaded)   Negative: Patient sounds very sick or weak to the triager   Negative: MILD difficulty breathing (e.g., minimal/no SOB at rest, SOB with walking, pulse <100)   Negative: Fever > 103 F (39.4 C)   Negative: [1] Fever > 101 F (38.3 C) AND [2] age > 60 years   Negative: [1] Fever > 100.0 F (37.8 C) AND [2] bedridden (e.g., CVA, chronic illness, recovering from surgery)   Negative: [1] HIGH RISK patient (e.g., weak immune system, age > 64 years, obesity with BMI 30 or higher, pregnant, chronic lung disease or other chronic medical condition) AND [2] COVID symptoms (e.g., cough, fever)  (Exceptions: Already seen by PCP and no new or worsening symptoms.)   Negative: [1] HIGH RISK patient AND [2] influenza is widespread in the community AND [3] ONE OR MORE respiratory symptoms: cough, sore throat, runny or stuffy nose   Negative: [1] HIGH RISK patient AND [2] influenza exposure within the last 7 days AND [3] ONE OR MORE respiratory symptoms: cough, sore throat, runny or stuffy nose   Negative: Fever present > 3 days (72 hours)   Negative: [1] Fever returns after gone for over 24 hours AND [2] symptoms worse or not improved   Negative: [1] Continuous (nonstop) coughing interferes with work or school AND [2] no improvement using cough treatment per Care Advice   Negative: [1] COVID-19 infection suspected by caller or triager AND [2] mild symptoms (cough, fever, or others) AND [3] negative COVID-19 rapid test   Negative: Cough present > 3 weeks   Negative: [1] COVID-19 diagnosed by positive lab test (e.g., PCR, rapid self-test kit) AND [2] NO symptoms (e.g., cough, fever, others)    Protocols used: Coronavirus (COVID-19)  Diagnosed or [id number]

## 2024-01-04 ENCOUNTER — PATIENT OUTREACH (OUTPATIENT)
Dept: CARE COORDINATION | Facility: CLINIC | Age: 54
End: 2024-01-04
Payer: COMMERCIAL

## 2024-01-18 ENCOUNTER — PATIENT OUTREACH (OUTPATIENT)
Dept: CARE COORDINATION | Facility: CLINIC | Age: 54
End: 2024-01-18
Payer: COMMERCIAL

## 2024-03-09 ENCOUNTER — HEALTH MAINTENANCE LETTER (OUTPATIENT)
Age: 54
End: 2024-03-09

## 2024-03-13 ENCOUNTER — OFFICE VISIT (OUTPATIENT)
Dept: FAMILY MEDICINE | Facility: CLINIC | Age: 54
End: 2024-03-13
Payer: COMMERCIAL

## 2024-03-13 VITALS
RESPIRATION RATE: 16 BRPM | SYSTOLIC BLOOD PRESSURE: 110 MMHG | HEART RATE: 83 BPM | DIASTOLIC BLOOD PRESSURE: 76 MMHG | BODY MASS INDEX: 23.55 KG/M2 | OXYGEN SATURATION: 98 % | WEIGHT: 193.4 LBS | HEIGHT: 76 IN | TEMPERATURE: 98.1 F

## 2024-03-13 DIAGNOSIS — M67.471 GANGLION CYST OF RIGHT FOOT: ICD-10-CM

## 2024-03-13 DIAGNOSIS — N50.811 RIGHT TESTICULAR PAIN: ICD-10-CM

## 2024-03-13 DIAGNOSIS — L30.1 DYSHIDROTIC ECZEMA: ICD-10-CM

## 2024-03-13 DIAGNOSIS — H81.13 BENIGN PAROXYSMAL POSITIONAL VERTIGO DUE TO BILATERAL VESTIBULAR DISORDER: ICD-10-CM

## 2024-03-13 DIAGNOSIS — G89.29 CHRONIC BILATERAL LOW BACK PAIN WITHOUT SCIATICA: Primary | ICD-10-CM

## 2024-03-13 DIAGNOSIS — M54.50 CHRONIC BILATERAL LOW BACK PAIN WITHOUT SCIATICA: Primary | ICD-10-CM

## 2024-03-13 PROBLEM — M89.8X7 EXOSTOSIS OF RIGHT FOOT: Status: RESOLVED | Noted: 2023-05-30 | Resolved: 2024-03-13

## 2024-03-13 PROCEDURE — G2211 COMPLEX E/M VISIT ADD ON: HCPCS | Performed by: STUDENT IN AN ORGANIZED HEALTH CARE EDUCATION/TRAINING PROGRAM

## 2024-03-13 PROCEDURE — 99214 OFFICE O/P EST MOD 30 MIN: CPT | Performed by: STUDENT IN AN ORGANIZED HEALTH CARE EDUCATION/TRAINING PROGRAM

## 2024-03-13 NOTE — PATIENT INSTRUCTIONS
Preventive Care Advice   This is general advice given by our system to help you stay healthy. However, your care team may have specific advice just for you. Please talk to your care team about your preventive care needs.  Nutrition  Eat 5 or more servings of fruits and vegetables each day.  Try wheat bread, brown rice and whole grain pasta (instead of white bread, rice, and pasta).  Get enough calcium and vitamin D. Check the label on foods and aim for 100% of the RDA (recommended daily allowance).  Lifestyle  Exercise at least 150 minutes each week   (30 minutes a day, 5 days a week).  Do muscle strengthening activities 2 days a week. These help control your weight and prevent disease.  No smoking.  Wear sunscreen to prevent skin cancer.  Have a dental exam and cleaning every 6 months.  Yearly exams  See your health care team every year to talk about:  Any changes in your health.  Any medicines your care team has prescribed.  Preventive care, family planning, and ways to prevent chronic diseases.  Shots (vaccines)   HPV shots (up to age 26), if you've never had them before.  Hepatitis B shots (up to age 59), if you've never had them before.  COVID-19 shot: Get this shot when it's due.  Flu shot: Get a flu shot every year.  Tetanus shot: Get a tetanus shot every 10 years.  Pneumococcal, hepatitis A, and RSV shots: Ask your care team if you need these based on your risk.  Shingles shot (for age 50 and up).  General health tests  Diabetes screening:  Starting at age 35, Get screened for diabetes at least every 3 years.  If you are younger than age 35, ask your care team if you should be screened for diabetes.  Cholesterol test: At age 39, start having a cholesterol test every 5 years, or more often if advised.  Bone density scan (DEXA): At age 50, ask your care team if you should have this scan for osteoporosis (brittle bones).  Hepatitis C: Get tested at least once in your life.  STIs (sexually transmitted  infections)  Before age 24: Ask your care team if you should be screened for STIs.  After age 24: Get screened for STIs if you're at risk. You are at risk for STIs (including HIV) if:  You are sexually active with more than one person.  You don't use condoms every time.  You or a partner was diagnosed with a sexually transmitted infection.  If you are at risk for HIV, ask about PrEP medicine to prevent HIV.  Get tested for HIV at least once in your life, whether you are at risk for HIV or not.  Cancer screening tests  Cervical cancer screening: If you have a cervix, begin getting regular cervical cancer screening tests at age 21. Most people who have regular screenings with normal results can stop after age 65. Talk about this with your provider.  Breast cancer scan (mammogram): If you've ever had breasts, begin having regular mammograms starting at age 40. This is a scan to check for breast cancer.  Colon cancer screening: It is important to start screening for colon cancer at age 45.  Have a colonoscopy test every 10 years (or more often if you're at risk) Or, ask your provider about stool tests like a FIT test every year or Cologuard test every 3 years.  To learn more about your testing options, visit: [email or web address]/[email or web address].  For help making a decision, visit: https://bit.ly/he98596.  Prostate cancer screening test: If you have a prostate and are age 55 to 69, ask your provider if you would benefit from a yearly prostate cancer screening test.  Lung cancer screening: If you are a current or former smoker age 50 to 80, ask your care team if ongoing lung cancer screenings are right for you.  For informational purposes only. Not to replace the advice of your health care provider. Copyright   2023 [employer]. All rights reserved. Clinically reviewed by the [facility] Transitions Program. [employer] [id number] - REV 01/24.

## 2024-03-13 NOTE — PROGRESS NOTES
Assessment & Plan     Chronic bilateral low back pain without sciatica  Rod is a 53-year-old male with history of BPPV, right varicocele which causes mild pain, ganglion cyst of right foot and dyshidrotic eczema who presents for establishing care.  He does not have any acute concerns today.  Currently back pain is well-controlled, will flare intermittently.  Continue to monitor    Benign paroxysmal positional vertigo due to bilateral vestibular disorder  Ongoing issue, he is able to manage this with exercises only.  Continue to monitor    Right testicular pain  Has right testicular pain that is mild from right varicocele, he does not wish to do anything about this currently, we will continue to monitor    Ganglion cyst of right foot  Has ganglion cyst of right foot which sometimes will cause issues with pain when he is wearing tight shoes, otherwise able to tolerate    Dyshidrotic eczema  Has dyshidrotic eczema, well-controlled with intermittent use of mometasone cream.        Patient already had general exam done at [facility] January 2024, follow-up January 2025 for annual physical.    Subjective   Rod is a 53 year old, presenting for the following health issues:  Establish Care        3/13/2024    10:30 AM   Additional Questions   Roomed by Deb GRESHAM     History of Present Illness       Reason for visit:  Transfer care    He eats 2-3 servings of fruits and vegetables daily.He consumes 0 sweetened beverage(s) daily.He exercises with enough effort to increase his heart rate 30 to 60 minutes per day.  He exercises with enough effort to increase his heart rate 5 days per week.   He is taking medications regularly.       No acute concerns          Review of Systems  Constitutional, neuro, ENT, endocrine, pulmonary, cardiac, gastrointestinal, genitourinary, musculoskeletal, integument and psychiatric systems are negative, except as otherwise noted.      Objective    /76 (BP Location: Right arm, Patient  Silver Nitrate Text: The wound bed was treated with silver nitrate after the biopsy was performed. "Position: Sitting, Cuff Size: Adult Regular)   Pulse 83   Temp 98.1  F (36.7  C) (Oral)   Resp 16   Ht 1.925 m (6' 3.79\")   Wt 87.7 kg (193 lb 6.4 oz)   SpO2 98%   BMI 23.67 kg/m    Body mass index is 23.67 kg/m .  Physical Exam   GENERAL: alert and no distress  EYES: Eyes grossly normal to inspection, PERRL and conjunctivae and sclerae normal  HENT: ear canals and TM's normal, nose and mouth without ulcers or lesions  NECK: no adenopathy, no asymmetry, masses, or scars  RESP: lungs clear to auscultation - no rales, rhonchi or wheezes  CV: regular rate and rhythm, normal S1 S2, no S3 or S4, no murmur, click or rub, no peripheral edema  ABDOMEN: soft, nontender, no hepatosplenomegaly, no masses and bowel sounds normal  MS: no gross musculoskeletal defects noted, no edema.  Cyst on right dorsal foot.  SKIN: no suspicious lesions or rashes  NEURO: Normal strength and tone, mentation intact and speech normal  PSYCH: mentation appears normal, affect normal/bright    Office Visit on 01/18/2023   Component Date Value Ref Range Status    Sodium 01/18/2023 141  136 - 145 mmol/L Final    Potassium 01/18/2023 4.2  3.4 - 5.3 mmol/L Final    Chloride 01/18/2023 105  98 - 107 mmol/L Final    Carbon Dioxide (CO2) 01/18/2023 25  22 - 29 mmol/L Final    Anion Gap 01/18/2023 11  7 - 15 mmol/L Final    Urea Nitrogen 01/18/2023 11.8  6.0 - 20.0 mg/dL Final    Creatinine 01/18/2023 0.87  0.67 - 1.17 mg/dL Final    Calcium 01/18/2023 9.6  8.6 - 10.0 mg/dL Final    Glucose 01/18/2023 108 (H)  70 - 99 mg/dL Final    Alkaline Phosphatase 01/18/2023 66  40 - 129 U/L Final    AST 01/18/2023 28  10 - 50 U/L Final    ALT 01/18/2023 26  10 - 50 U/L Final    Protein Total 01/18/2023 7.3  6.4 - 8.3 g/dL Final    Albumin 01/18/2023 4.5  3.5 - 5.2 g/dL Final    Bilirubin Total 01/18/2023 0.6  <=1.2 mg/dL Final    GFR Estimate 01/18/2023 >90  >60 mL/min/1.73m2 Final    Effective December 21, 2021 eGFRcr in adults is calculated using the 2021 " CKD-EPI creatinine equation which includes age and gender (Rhonda et al., [place], DOI: 10.1056/FRKWii1463162)    WBC Count 01/18/2023 3.6 (L)  4.0 - 11.0 10e3/uL Final    RBC Count 01/18/2023 4.99  4.40 - 5.90 10e6/uL Final    Hemoglobin 01/18/2023 15.3  13.3 - 17.7 g/dL Final    Hematocrit 01/18/2023 44.3  40.0 - 53.0 % Final    MCV 01/18/2023 89  78 - 100 fL Final    MCH 01/18/2023 30.7  26.5 - 33.0 pg Final    MCHC 01/18/2023 34.5  31.5 - 36.5 g/dL Final    RDW 01/18/2023 12.4  10.0 - 15.0 % Final    Platelet Count 01/18/2023 177  150 - 450 10e3/uL Final    Cholesterol 01/18/2023 207 (H)  <200 mg/dL Final    Triglycerides 01/18/2023 67  <150 mg/dL Final    Direct Measure HDL 01/18/2023 58  >=40 mg/dL Final    LDL Cholesterol Calculated 01/18/2023 136 (H)  <=100 mg/dL Final    Non HDL Cholesterol 01/18/2023 149 (H)  <130 mg/dL Final    Prostate Specific Antigen Screen 01/18/2023 0.94  0.00 - 3.50 ng/mL Final       Recent Data from [facility]  Related to Lipid Panel and Direct LDL(If Needed)  Component 01/29/24 01/29/24   Cholesterol 201 High  --   Triglyceride 96 --   HDL Cholesterol 53 --   LDL, Calculated 129 --   Non HDL Chol, Calculated 148 --   Cholesterol/HDL Ratio 3.8 --   Hours Fasting 12.0 12.0     Hepatitis C Antibody, with Reflex  Specimen: Blood  Component  Ref Range & Units 1 mo ago Comments   Hepatitis C Antibody  Negative (Non Reactive) Negative (Non Reactive) Antibodies to HCV not detected. Does not exclude the possiblity of exposure to HCV.   Resulting Agency [facility]    Specimen Collected: 01/29/24  8:41 AM    Performed by: [facility] Last Resulted: 01/29/24  2:44 PM   Received From: [facility]  Result Received: 03/13/24 10:15 AM    View Encounter    HIV 1/2 Ag/Ab 4th Generation  Specimen: Blood  Component  Ref Range & Units 1 mo ago Comments   HIV 1/2 Antigen/Antibody (4th generation)  Negative (Non Reactive) Negative (Non Reactive) HIV-1 p24 Antigen and HIV-1/HIV-2  Antibody not detected   Resulting Agency [facility]    Specimen Collected: 01/29/24  8:41 AM    Performed by: [facility] Last Resulted: 01/29/24  2:44 PM   Received From: [facility]  Result Received: 03/13/24 10:15 AM    View Encounter    Comp Metabolic Panel  Specimen: Blood  Component  Ref Range & Units 1 mo ago Resulting Agency Comments   Sodium  136 - 145 mmol/L 139 [facility]    Potassium  3.5 - 5.1 mmol/L 4.7 [facility]    Chloride  98 - 109 mmol/L 104 [facility]    CO2  20 - 29 mmol/L 28 [facility]    Anion Gap  7 - 16 mmol/L 7 [facility]    Calcium  8.4 - 10.4 mg/dL 9.3 [facility]    BUN  7 - 26 mg/dL 12 [facility]    Creatinine  0.73 - 1.18 mg/dL 0.86 [facility]    Alkaline Phosphatase  40 - 150 U/L 76 [facility]    AST (SGOT)  10 - 40 U/L 23 [facility]    ALT (SGPT)  <=55 U/L 25 [facility]    Bilirubin, Total  0.2 - 1.2 mg/dL 0.7 [facility]    Protein, Total  6.4 - 8.3 g/dL 7.7 [facility]    Albumin  3.5 - 5.0 g/dL 4.2 [facility]    Glucose  70 - 100 mg/dL 97 [facility] The given reference range is for the fasting state. Non-fasting reference range for glucose is 70 - 180 mg/dL.   GFR, Estimated  >60 mL/min/1.73m2 >60 [facility]    Hours Fasting  8 - 12 Hours 12.0 [facility] ([facility])    Specimen Collected: 01/29/24  8:41 AM    Performed by: [facility] Last Resulted: 01/29/24  8:27 PM   Received From: [facility]  Result Received: 03/13/24 10:15 AM    View Encounter    Recent Data from [facility]  Related to Comp Metabolic Panel  Component 01/29/24 01/29/24   Sodium -- 139   Potassium -- 4.7   Chloride -- 104   CO2 -- 28   Anion Gap -- 7   Calcium -- 9.3   BUN -- 12   Creatinine -- 0.86   Alkaline Phosphatase -- 76   AST (SGOT) -- 23   ALT (SGPT) -- 25   Bilirubin, Total -- 0.7   Protein, Total -- 7.7    Albumin -- 4.2   Glucose -- 97    GFR, Estimated -- >60   Hours Fasting 12.0 12.0     Hgb A1C  Specimen: Blood  Component  Ref Range & Units 1 mo ago Comments   Hemoglobin A1C  <=5.6 % 5.5    Estimated Average Glucose (Calc)  < 117 mg/dL 111 Estimated average glucose (eAG) converts A1c into glucose units (mg/dL) and estimates average glucose over the past approximately 3 months.  The eAG reference interval (<117 mg/dL) corresponds to an A1c of <5.7%.   Resulting Agency [facility]    Specimen Collected: 01/29/24  8:41 AM    Performed by: [facility] Last Resulted: 01/29/24  8:20 PM   Received From: [facility]  Result Received: 03/13/24 10:15 AM    View Encounter    Complete Blood Count-W/Diff  Specimen: Blood  Component  Ref Range & Units 1 mo ago   WBC  3.5 - 10.5 x10(9)/L 4.4   RBC  4.32 - 5.72 x10(12)/L 5.13   Hemoglobin  13.5 - 17.5 g/dL 15.8   HCT  38.8 - 50.0 % 45.3   MCV  80.0 - 100.0 fL 88.3   MCH  27.6 - 33.3 pg 30.8   MCHC  31.5 - 35.2 g/dL 34.9   RDW  11.9 - 15.5 % 12.4   Platelets  150 - 450 x10(9)/L 190   Neutrophil Absolute  1.7 - 7.0 10(9)/L 2.2   Lymphocyte Absolute  1.0 - 4.8 10(9)/L 1.6   Monocyte Absolute  0.2 - 0.9 10(9)/L 0.3   Eosinophil Absolute  0.0 - 0.5 10(9)/L 0.3   Basophil Absolute  0.0 - 0.3 10(9)/L 0.0   Immature Granulocyte %  0.0 - 0.5 % 0.0   Resulting Agency [facility] ([facility])   Specimen Collected: 01/29/24  8:41 AM    Performed by: [facility] ([facility]) Last Resulted: 01/29/24  9:04 AM   Received From: [facility]  Result Received: 03/13/24 10:15 AM    View Encounter    Recent Data from [facility]  Related to Complete Blood Count-W/Diff  Component 01/29/24 11/02/01   WBC 4.4 --   RBC 5.13 --   Hemoglobin 15.8 15.1   HCT 45.3 --   MCV 88.3 --   MCH 30.8 --   MCHC 34.9 --   RDW 12.4 11.8   Platelets 190 --   Neutrophil Absolute 2.2 --   Lymphocyte Absolute 1.6 --   Monocyte Absolute 0.3 --   Eosinophil Absolute 0.3 --   Basophil Absolute  0.0 --   Immature Granulocyte % 0.0 --   Basophil % -- 0.3   Differential Verify -- Auto-Dif   Eosinophil -- 7.2 High Panic    Hematocrit -- 44.3   Lymphocyte % -- 38.7   Mean Corpuscular Hemoglobin -- 31.0   Mean Corpuscular Hemoglobin [place] -- 34.1   Mean Corpuscular Volume -- 90.9   Monocyte Percent -- 7.6   Neutrophil -- 46.2 Low Panic    Neutrophils Absolute Count -- 2.0   Platelet Count -- 191   Red Blood Cell Count -- 4.87   White Blood Cell Count -- 4.3       The 10-year ASCVD risk score (Tasha BARRIENTOS, et al., 2019) is: 2.7%    Values used to calculate the score:      Age: 53 years      Sex: Male      Is Non- : No      Diabetic: No      Tobacco smoker: No      Systolic Blood Pressure: 110 mmHg      Is BP treated: No      HDL Cholesterol: 58 mg/dL      Total Cholesterol: 207 mg/dL      Signed Electronically by: Real Castaneda MD

## 2024-05-06 ENCOUNTER — OFFICE VISIT (OUTPATIENT)
Dept: FAMILY MEDICINE | Facility: CLINIC | Age: 54
End: 2024-05-06
Payer: COMMERCIAL

## 2024-05-06 DIAGNOSIS — L30.1 DYSHIDROTIC DERMATITIS: ICD-10-CM

## 2024-05-06 DIAGNOSIS — D18.01 CHERRY ANGIOMA: ICD-10-CM

## 2024-05-06 DIAGNOSIS — D22.9 MULTIPLE BENIGN NEVI: ICD-10-CM

## 2024-05-06 DIAGNOSIS — L81.4 LENTIGINES: ICD-10-CM

## 2024-05-06 DIAGNOSIS — L82.1 SEBORRHEIC KERATOSES: ICD-10-CM

## 2024-05-06 DIAGNOSIS — L30.9 DERMATITIS: ICD-10-CM

## 2024-05-06 DIAGNOSIS — L72.0 EIC (EPIDERMAL INCLUSION CYST): Primary | ICD-10-CM

## 2024-05-06 PROCEDURE — 99204 OFFICE O/P NEW MOD 45 MIN: CPT | Performed by: PHYSICIAN ASSISTANT

## 2024-05-06 ASSESSMENT — PAIN SCALES - GENERAL: PAINLEVEL: NO PAIN (0)

## 2024-05-06 NOTE — PROGRESS NOTES
[facility] Dermatology Note  Encounter Date: May 6, 2024  Office Visit      Dermatology Problem List:  FBSE: 5/6/24    EIC, L posterior neck   Dyshidrotic Eczema  - Tx: Mometasone cream.     Social:  - [facility]  ____________________________________________    Assessment & Plan:    # EIC, L posterior neck, asx - had a cyst there previously which was excised  - Measuring about 6 -7 mm in size  - Advised patient that if cyst were to become symptomatic he can RTC for removal.     # Dyshidrotic Eczema hands  - Discussed etiology, relationship with stress, chronicity/recurrence  - Continue use of mometasone cream prn with flares    # Dermatitis - legs - not flaring today  - edu on use of moisturizers - uses vanicream - can continue  - may also use mometasone on thighs as well if needed    # Benign findings: multiple benign nevi, lentigines, cherry angiomas, SKs  - edu on benign etiology  - Signs and Symptoms of non-melanoma skin cancer and ABCDEs of melanoma reviewed with patient. Patient encouraged to perform monthly self skin exams and educated on how to perform them. UV precautions reviewed with patient. Patient was asked about new or changing moles/lesions on body.   - Sunscreen: Apply 20 minutes prior to going outdoors and reapply every two hours, when wet or sweating. We recommend using an SPF 30 or higher, and to use one that is water resistant.     - RTC for changes    Procedures Performed:   None    Follow-up: 1 year(s) in-person, or earlier for new or changing lesions    Staff and scribe:    Scribe Disclosure:   I, ROSALIA RAMIREZ, am serving as a scribe; to document services personally performed by Joanne Melendrez PA-C -based on data collection and the provider's statements to me.     Provider Disclosure:  I agree with above History, Review of Systems, Physical exam and Plan.  I have reviewed the content of the documentation and have edited it as needed. I have  personally performed the services documented here and the documentation accurately represents those services and the decisions I have made.      Electronically signed by:    All risks, benefits and alternatives were discussed with patient.  Patient is in agreement and understands the assessment and plan.  All questions were answered.    Joanne Melendrez PA-C, MPAS  [facility] [place]: Phone: [phone], Fax: [phone]  [facility]: Phone: [phone],  Fax: [phone]  [facility]: Phone: [phone], Fax: [phone]  ____________________________________________    CC: Skin Check      Reviewed patients past medical history and pertinent chart review prior to patient's visit today.     HPI:  Mr. Sb Shultz is a 53 year old male who presents today as a new patient for a possible cyst evaluation and FBSC.      Today patient reported a possible cyst on his neck.     Denied any family or personal hx of skin cancer.     Patient is otherwise feeling well, without additional concerns.    Labs:  N/A    Physical Exam:  Vitals: There were no vitals taken for this visit.  SKIN: Full skin, which includes the head/face, both arms, chest, back, abdomen,both legs, genitalia and/or groin buttocks, digits and/or nails, was examined.   - L posterior neck there is a 6 -7 mm cystic lesion. Just distal to a scar where a prior cyst was excised.    - Benson's skin type II, has <100 nevi  - There are dome shaped bright red papules on the trunk.   - Multiple regular brown pigmented macules and papules are identified on the trunk and extremities.   - Scattered brown macules on sun exposed areas.  - There are waxy stuck on tan to brown papules on the trunk.   - no visible rash on hands or legs  - No other lesions of concern on areas examined.     Medications:  Current Outpatient Medications   Medication Sig Dispense Refill     carboxymethylcellulose (REFRESH PLUS) 0.5 % Dpet ophthalmic dropperette [CARBOXYMETHYLCELLULOSE (REFRESH PLUS) 0.5 % DPET OPHTHALMIC DROPPERETTE]       mometasone (ELOCON) 0.1 % cream [MOMETASONE (ELOCON) 0.1 % CREAM] APPLY TO AFFECTED AREA TWICE A DAY AS DIRECTED APPLY SPARINGLY TO CLEAN SKIN       No current facility-administered medications for this visit.      Past Medical/Surgical History:   Patient Active Problem List   Diagnosis    TMJ Pain    Generalized anxiety disorder    Ganglion cyst of right foot    Benign paroxysmal positional vertigo due to bilateral vestibular disorder    Dyshidrotic eczema    Right testicular pain    Chronic bilateral low back pain without sciatica    Healthcare maintenance     Past Medical History:   Diagnosis Date    Anxiety     Vitamin D deficiency     On vitamin D supplement

## 2024-05-06 NOTE — LETTER
5/6/2024         RE: Sb Shultz  [address] [address]        Dear Colleague,    Thank you for referring your patient, Sb Shultz, to the [facility]. Please see a copy of my visit note below.    [facility] Dermatology Note  Encounter Date: May 6, 2024  Office Visit      Dermatology Problem List:  FBSE: 5/6/24    EIC, L posterior neck   Dyshidrotic Eczema  - Tx: Mometasone cream.     Social:  - [facility]  ____________________________________________    Assessment & Plan:    # EIC, L posterior neck, asx - had a cyst there previously which was excised  - Measuring about 6 -7 mm in size  - Advised patient that if cyst were to become symptomatic he can RTC for removal.     # Dyshidrotic Eczema hands  - Discussed etiology, relationship with stress, chronicity/recurrence  - Continue use of mometasone cream prn with flares    # Dermatitis - legs - not flaring today  - edu on use of moisturizers - uses vanicream - can continue  - may also use mometasone on thighs as well if needed    # Benign findings: multiple benign nevi, lentigines, cherry angiomas, SKs  - edu on benign etiology  - Signs and Symptoms of non-melanoma skin cancer and ABCDEs of melanoma reviewed with patient. Patient encouraged to perform monthly self skin exams and educated on how to perform them. UV precautions reviewed with patient. Patient was asked about new or changing moles/lesions on body.   - Sunscreen: Apply 20 minutes prior to going outdoors and reapply every two hours, when wet or sweating. We recommend using an SPF 30 or higher, and to use one that is water resistant.     - RTC for changes    Procedures Performed:   None    Follow-up: 1 year(s) in-person, or earlier for new or changing lesions    Staff and scribe:    Scribe Disclosure:   ROSALIA CROUCH, am serving as a scribe; to document services personally performed by Joanne Melendrez PA-C  -based on data collection and the provider's statements to me.     Provider Disclosure:  I agree with above History, Review of Systems, Physical exam and Plan.  I have reviewed the content of the documentation and have edited it as needed. I have personally performed the services documented here and the documentation accurately represents those services and the decisions I have made.      Electronically signed by:    All risks, benefits and alternatives were discussed with patient.  Patient is in agreement and understands the assessment and plan.  All questions were answered.    Joanne Melendrez PA-C, MPAS  [facility] [place]: Phone: [phone], Fax: [phone]  [facility]: Phone: [phone],  Fax: [phone]  [facility]: Phone: [phone], Fax: [phone]  ____________________________________________    CC: Skin Check      Reviewed patients past medical history and pertinent chart review prior to patient's visit today.     HPI:  Mr. Sb Shultz is a 53 year old male who presents today as a new patient for a possible cyst evaluation and FBSC.      Today patient reported a possible cyst on his neck.     Denied any family or personal hx of skin cancer.     Patient is otherwise feeling well, without additional concerns.    Labs:  N/A    Physical Exam:  Vitals: There were no vitals taken for this visit.  SKIN: Full skin, which includes the head/face, both arms, chest, back, abdomen,both legs, genitalia and/or groin buttocks, digits and/or nails, was examined.   - L posterior neck there is a 6 -7 mm cystic lesion. Just distal to a scar where a prior cyst was excised.    - Benson's skin type II, has <100 nevi  - There are dome shaped bright red papules on the trunk.   - Multiple regular brown pigmented macules and papules are identified on the trunk and extremities.   - Scattered brown macules on sun  exposed areas.  - There are waxy stuck on tan to brown papules on the trunk.   - no visible rash on hands or legs  - No other lesions of concern on areas examined.     Medications:  Current Outpatient Medications   Medication Sig Dispense Refill     carboxymethylcellulose (REFRESH PLUS) 0.5 % Dpet ophthalmic dropperette [CARBOXYMETHYLCELLULOSE (REFRESH PLUS) 0.5 % DPET OPHTHALMIC DROPPERETTE]        mometasone (ELOCON) 0.1 % cream [MOMETASONE (ELOCON) 0.1 % CREAM] APPLY TO AFFECTED AREA TWICE A DAY AS DIRECTED APPLY SPARINGLY TO CLEAN SKIN       No current facility-administered medications for this visit.      Past Medical/Surgical History:   Patient Active Problem List   Diagnosis     TMJ Pain     Generalized anxiety disorder     Ganglion cyst of right foot     Benign paroxysmal positional vertigo due to bilateral vestibular disorder     Dyshidrotic eczema     Right testicular pain     Chronic bilateral low back pain without sciatica     Healthcare maintenance     Past Medical History:   Diagnosis Date     Anxiety      Vitamin D deficiency     On vitamin D supplement                        Again, thank you for allowing me to participate in the care of your patient.        Sincerely,        Joanne Melendrez PA-C

## 2024-05-14 ENCOUNTER — DOCUMENTATION ONLY (OUTPATIENT)
Dept: OTHER | Facility: CLINIC | Age: 54
End: 2024-05-14
Payer: COMMERCIAL

## 2024-06-26 ENCOUNTER — MYC MEDICAL ADVICE (OUTPATIENT)
Dept: FAMILY MEDICINE | Facility: CLINIC | Age: 54
End: 2024-06-26
Payer: COMMERCIAL

## 2024-06-27 ENCOUNTER — ANCILLARY PROCEDURE (OUTPATIENT)
Dept: GENERAL RADIOLOGY | Facility: CLINIC | Age: 54
End: 2024-06-27
Attending: NURSE PRACTITIONER
Payer: COMMERCIAL

## 2024-06-27 ENCOUNTER — OFFICE VISIT (OUTPATIENT)
Dept: FAMILY MEDICINE | Facility: CLINIC | Age: 54
End: 2024-06-27
Payer: COMMERCIAL

## 2024-06-27 VITALS
DIASTOLIC BLOOD PRESSURE: 77 MMHG | TEMPERATURE: 97.6 F | SYSTOLIC BLOOD PRESSURE: 118 MMHG | HEIGHT: 76 IN | WEIGHT: 192 LBS | BODY MASS INDEX: 23.38 KG/M2 | RESPIRATION RATE: 16 BRPM | OXYGEN SATURATION: 98 % | HEART RATE: 66 BPM

## 2024-06-27 DIAGNOSIS — S63.502D SPRAIN OF LEFT WRIST, SUBSEQUENT ENCOUNTER: ICD-10-CM

## 2024-06-27 DIAGNOSIS — S63.502D SPRAIN OF LEFT WRIST, SUBSEQUENT ENCOUNTER: Primary | ICD-10-CM

## 2024-06-27 PROCEDURE — 99213 OFFICE O/P EST LOW 20 MIN: CPT | Performed by: NURSE PRACTITIONER

## 2024-06-27 PROCEDURE — 73110 X-RAY EXAM OF WRIST: CPT | Mod: TC | Performed by: RADIOLOGY

## 2024-06-27 NOTE — PROGRESS NOTES
"  Assessment & Plan     Sprain of left wrist, subsequent encounter  Xray is normal.  I think we should continue to treat as sprain. Consider PT if not improving.   Recommend wearing wrist brace when active and at night to prevent movement.   - XR Wrist Left G/E 3 Views; Future                Subjective   Rod is a 53 year old, presenting for the following health issues:  Wrist Pain (Left wrist pain ; ongoing for 3.5 weeks ; was scrubbing floor and leaned a little into it )    History of Present Illness       Reason for visit:  Wriest and arm pain  Symptom onset:  3-4 weeks ago  Symptoms include:  Left wrist pain tingling pain achey pain  Symptom intensity:  Mild  Symptom progression:  Staying the same  Had these symptoms before:  No  What makes it better:  Rest on pillow.    He eats 2-3 servings of fruits and vegetables daily.He consumes 0 sweetened beverage(s) daily.He exercises with enough effort to increase his heart rate 30 to 60 minutes per day.  He exercises with enough effort to increase his heart rate 5 days per week.   He is taking medications regularly.    Left Wrist:   Xray not completed at [facility] b/c didn't seem like fracture  Curls hands when sleeping. About 3.5 weeks ago was cleaning floor on hands and knees and slip on hand. Was seen at [facility] a couple of weeks ago. Injury was thought to be wrist sprain. They discussed exercises to do and has not been helpful and maybe even worse.     Injury in wrist. Pain that extends to pinky and ring finger.  Has been using wrist brace loosely at night. If puts on pillow has no pain as well. No pain when had wrist brace on this morning and biking. Wrist brace helps with pain                 Objective    /77 (BP Location: Right arm, Patient Position: Sitting, Cuff Size: Adult Regular)   Pulse 66   Temp 97.6  F (36.4  C) (Temporal)   Resp 16   Ht 1.925 m (6' 3.79\")   Wt 87.1 kg (192 lb)   SpO2 98%   BMI 23.50 kg/m    Body mass index is 23.5 kg/m .  Physical " Exam  Constitutional:       Appearance: Normal appearance.   Cardiovascular:      Rate and Rhythm: Normal rate and regular rhythm.      Heart sounds: Normal heart sounds.   Neurological:      General: No focal deficit present.      Mental Status: He is alert and oriented to person, place, and time.   Psychiatric:         Mood and Affect: Mood normal.         Behavior: Behavior normal.                  Signed Electronically by: WIL BA CNP

## 2024-10-29 ENCOUNTER — OFFICE VISIT (OUTPATIENT)
Dept: FAMILY MEDICINE | Facility: CLINIC | Age: 54
End: 2024-10-29
Payer: COMMERCIAL

## 2024-10-29 ENCOUNTER — ORDERS ONLY (AUTO-RELEASED) (OUTPATIENT)
Dept: FAMILY MEDICINE | Facility: CLINIC | Age: 54
End: 2024-10-29
Payer: COMMERCIAL

## 2024-10-29 VITALS
BODY MASS INDEX: 23.62 KG/M2 | SYSTOLIC BLOOD PRESSURE: 112 MMHG | HEART RATE: 82 BPM | OXYGEN SATURATION: 98 % | RESPIRATION RATE: 14 BRPM | HEIGHT: 76 IN | TEMPERATURE: 98 F | DIASTOLIC BLOOD PRESSURE: 78 MMHG | WEIGHT: 194 LBS

## 2024-10-29 DIAGNOSIS — F41.1 GENERALIZED ANXIETY DISORDER: ICD-10-CM

## 2024-10-29 DIAGNOSIS — R00.2 PALPITATIONS: ICD-10-CM

## 2024-10-29 DIAGNOSIS — R00.2 PALPITATIONS: Primary | ICD-10-CM

## 2024-10-29 DIAGNOSIS — H93.A2 PULSATILE TINNITUS, LEFT EAR: ICD-10-CM

## 2024-10-29 LAB
BASOPHILS # BLD AUTO: 0 10E3/UL (ref 0–0.2)
BASOPHILS NFR BLD AUTO: 1 %
EOSINOPHIL # BLD AUTO: 0.4 10E3/UL (ref 0–0.7)
EOSINOPHIL NFR BLD AUTO: 8 %
ERYTHROCYTE [DISTWIDTH] IN BLOOD BY AUTOMATED COUNT: 12.3 % (ref 10–15)
HCT VFR BLD AUTO: 46.4 % (ref 40–53)
HGB BLD-MCNC: 15.6 G/DL (ref 13.3–17.7)
IMM GRANULOCYTES # BLD: 0 10E3/UL
IMM GRANULOCYTES NFR BLD: 0 %
LYMPHOCYTES # BLD AUTO: 1.7 10E3/UL (ref 0.8–5.3)
LYMPHOCYTES NFR BLD AUTO: 38 %
MCH RBC QN AUTO: 30.5 PG (ref 26.5–33)
MCHC RBC AUTO-ENTMCNC: 33.6 G/DL (ref 31.5–36.5)
MCV RBC AUTO: 91 FL (ref 78–100)
MONOCYTES # BLD AUTO: 0.4 10E3/UL (ref 0–1.3)
MONOCYTES NFR BLD AUTO: 10 %
NEUTROPHILS # BLD AUTO: 1.9 10E3/UL (ref 1.6–8.3)
NEUTROPHILS NFR BLD AUTO: 43 %
PLATELET # BLD AUTO: 194 10E3/UL (ref 150–450)
RBC # BLD AUTO: 5.12 10E6/UL (ref 4.4–5.9)
WBC # BLD AUTO: 4.4 10E3/UL (ref 4–11)

## 2024-10-29 PROCEDURE — 84443 ASSAY THYROID STIM HORMONE: CPT | Performed by: STUDENT IN AN ORGANIZED HEALTH CARE EDUCATION/TRAINING PROGRAM

## 2024-10-29 PROCEDURE — 85025 COMPLETE CBC W/AUTO DIFF WBC: CPT | Performed by: STUDENT IN AN ORGANIZED HEALTH CARE EDUCATION/TRAINING PROGRAM

## 2024-10-29 PROCEDURE — 99214 OFFICE O/P EST MOD 30 MIN: CPT | Mod: 25 | Performed by: STUDENT IN AN ORGANIZED HEALTH CARE EDUCATION/TRAINING PROGRAM

## 2024-10-29 PROCEDURE — 90673 RIV3 VACCINE NO PRESERV IM: CPT | Performed by: STUDENT IN AN ORGANIZED HEALTH CARE EDUCATION/TRAINING PROGRAM

## 2024-10-29 PROCEDURE — 93005 ELECTROCARDIOGRAM TRACING: CPT | Performed by: STUDENT IN AN ORGANIZED HEALTH CARE EDUCATION/TRAINING PROGRAM

## 2024-10-29 PROCEDURE — 80048 BASIC METABOLIC PNL TOTAL CA: CPT | Performed by: STUDENT IN AN ORGANIZED HEALTH CARE EDUCATION/TRAINING PROGRAM

## 2024-10-29 PROCEDURE — 93010 ELECTROCARDIOGRAM REPORT: CPT | Performed by: STUDENT IN AN ORGANIZED HEALTH CARE EDUCATION/TRAINING PROGRAM

## 2024-10-29 PROCEDURE — 90471 IMMUNIZATION ADMIN: CPT | Performed by: STUDENT IN AN ORGANIZED HEALTH CARE EDUCATION/TRAINING PROGRAM

## 2024-10-29 PROCEDURE — 36415 COLL VENOUS BLD VENIPUNCTURE: CPT | Performed by: STUDENT IN AN ORGANIZED HEALTH CARE EDUCATION/TRAINING PROGRAM

## 2024-10-29 NOTE — PROGRESS NOTES
Assessment & Plan     Charlie Velasco is a 53-year-old male presenting to discuss palpitations.  He reports that he has had palpitations in the past but it has been many years.  They occur randomly throughout the day and occur more frequently when he is pain attention or thinking about them.  Will be a couple of beats every minute but no more than 5 beats in a minute where he feels the palpitations.  It is not associated with chest pain or shortness of breath, but is frequently associated with a pulsing sensation in his left ear.  This will occur frequently throughout the day, 20-30 times on average.  Often will occur while he is walking although it is not reliably occurring when walking.    Vital signs within normal limits    Examination today is normal without contributory findings.    EKG was done showing normal sinus rhythm with minimal voltage criteria for LVH.    Reviewed his prior event monitor which showed rare premature ventricular contractions, however many of his reported symptomatic episodes occurred at the same time as his PVCs.      Believe that more than likely his symptoms are occurring due to PVCs, however will obtain CBC, TSH to rule out anemia and thyroid disease.  Will obtain a Zio patch to evaluate for arrhythmia, he was encouraged to keep a log of his episodes.  No significant findings will consider echocardiogram given possible LVH by EKG, but believe this to be noncontributory to patient's symptoms.    Encouraged him go to emergency room if he is having chest pain shortness of breath    - CBC with platelets and differential  - TSH with free T4 reflex  - EKG 12-lead, tracing only  - ZIO PATCH MAIL OUT  - Basic metabolic panel  - CBC with platelets and differential  - TSH with free T4 reflex  - Basic metabolic panel    Pulsatile tinnitus, left ear    Reports he has had left-sided pulsatile tinnitus that comes and goes with his palpitations, for the past 3 weeks.  It is not continuous,  only occurring for 1 beat, potentially a few times in a minute, with 20 or 30 such episodes over the course of the day.  There is no change in hearing, no dizziness/vertigo, no headache, ear or facial pain.      HEENT exam today is normal, no neurologic abnormalities    Because of unilateral location and pulsatile tinnitus do recommend for ENT referral for possible vascular tinnitus, for further evaluation, he was agreeable and this was placed.    - Adult ENT  Referral    Generalized anxiety disorder  Reports anxiety but he is not treated.  If all the above is normal, will discuss further with patient medication options versus therapy.      Seborrheic keratosis  Patient has seborrheic keratosis x 2 on head 1 on left temporal area 1 on right.  Notified him of the benign nature of these, no further evaluation required.  Will monitor for changes    Subjective   Rod is a 53 year old, presenting for the following health issues:  Palpitations (Heart palpitations and anxiety.) and Derm Problem (Spots on heads that would like it looked at)        10/29/2024     9:10 AM   Additional Questions   Roomed by LUIS FU     History of Present Illness       Reason for visit:  Palpitations, anxiety about palpitations    He eats 4 or more servings of fruits and vegetables daily.He consumes 0 sweetened beverage(s) daily.He exercises with enough effort to increase his heart rate 30 to 60 minutes per day.  He exercises with enough effort to increase his heart rate 5 days per week.   He is taking medications regularly.       He reports that he has been having palpitations.  Has previously had this occur for about 30 years, it had gone away for about five years and now has come back.  It feels pretty much the same, as far as he can remember. Frequency of symptoms is variable, moreso in the morning it occurs rather than at night.  He can bring on the palpitations when he thinks about it.  Feels like a heavier beat, and sometimes  "feels like it paused. It occurs on average about 20-30 times a day.  He will notice just one abnormal beat at a time, at soonest he will notice these abnormal beats happening a few times a minute.     Denies chest pain, shortness of breath.     He notices his left ear throbbing, which he can hear.  He notices this when he is getting the palpitations, he can calm this down/stop this if he sits down and relaxes. This throbbing has been going on for about 3 weeks.  No changes with hearing. He feels it more in his ear when he is walking. Did not hear it while doing a bike ride the other day however.  It occurs also about as often as he gets palpitations, it occurs a few times a minute. It does not last/is not continuous.  No dizziness.     He has had concerns about his heart for about 30 years.  He has had several tests done that were always normal to include holter.     He has a lot of worries about the palpitations, and the more he worries the more he notices them.     He reports he has anxiety about  health issues, most of his worries are about this.  He will fixate on the medial issues. Does not generally have anxiety about other issues.               Review of Systems  Constitutional, neuro, ENT, endocrine, pulmonary, cardiac, gastrointestinal, genitourinary, musculoskeletal, integument and psychiatric systems are negative, except as otherwise noted.      Objective    /78 (BP Location: Right arm, Patient Position: Sitting, Cuff Size: Adult Regular)   Pulse 82   Temp 98  F (36.7  C) (Oral)   Resp 14   Ht 1.925 m (6' 3.79\")   Wt 88 kg (194 lb)   SpO2 98%   BMI 23.75 kg/m    Body mass index is 23.75 kg/m .  Physical Exam   GENERAL: alert and no distress  EYES: Eyes grossly normal to inspection, PERRL and conjunctivae and sclerae normal  HENT: ear canals and TM's normal, nose and mouth without ulcers or lesions  NECK: no adenopathy, no asymmetry, masses, or scars  RESP: lungs clear to auscultation - no " rales, rhonchi or wheezes  CV: regular rate and rhythm, normal S1 S2, no S3 or S4, no murmur, click or rub, no peripheral edema  ABDOMEN: soft, nontender, no hepatosplenomegaly, no masses and bowel sounds normal  MS: no gross musculoskeletal defects noted, no edema  SKIN: Has flesh-colored plaques that are approximately 0.5 cm in diameter, waxy stuck on appearance, 1 on the right temporal area and one on the left temporal area.  No suspicious lesions or rashes  NEURO: Normal strength and tone, mentation intact and speech normal  PSYCH: mentation appears normal, affect normal/bright    Office Visit on 01/18/2023   Component Date Value Ref Range Status    Sodium 01/18/2023 141  136 - 145 mmol/L Final    Potassium 01/18/2023 4.2  3.4 - 5.3 mmol/L Final    Chloride 01/18/2023 105  98 - 107 mmol/L Final    Carbon Dioxide (CO2) 01/18/2023 25  22 - 29 mmol/L Final    Anion Gap 01/18/2023 11  7 - 15 mmol/L Final    Urea Nitrogen 01/18/2023 11.8  6.0 - 20.0 mg/dL Final    Creatinine 01/18/2023 0.87  0.67 - 1.17 mg/dL Final    Calcium 01/18/2023 9.6  8.6 - 10.0 mg/dL Final    Glucose 01/18/2023 108 (H)  70 - 99 mg/dL Final    Alkaline Phosphatase 01/18/2023 66  40 - 129 U/L Final    AST 01/18/2023 28  10 - 50 U/L Final    ALT 01/18/2023 26  10 - 50 U/L Final    Protein Total 01/18/2023 7.3  6.4 - 8.3 g/dL Final    Albumin 01/18/2023 4.5  3.5 - 5.2 g/dL Final    Bilirubin Total 01/18/2023 0.6  <=1.2 mg/dL Final    GFR Estimate 01/18/2023 >90  >60 mL/min/1.73m2 Final    Effective December 21, 2021 eGFRcr in adults is calculated using the 2021 CKD-EPI creatinine equation which includes age and gender (Rhonda et al., NEJM, DOI: 10.1056/DBGOzm5251240)    WBC Count 01/18/2023 3.6 (L)  4.0 - 11.0 10e3/uL Final    RBC Count 01/18/2023 4.99  4.40 - 5.90 10e6/uL Final    Hemoglobin 01/18/2023 15.3  13.3 - 17.7 g/dL Final    Hematocrit 01/18/2023 44.3  40.0 - 53.0 % Final    MCV 01/18/2023 89  78 - 100 fL Final    MCH 01/18/2023 30.7   26.5 - 33.0 pg Final    MCHC 01/18/2023 34.5  31.5 - 36.5 g/dL Final    RDW 01/18/2023 12.4  10.0 - 15.0 % Final    Platelet Count 01/18/2023 177  150 - 450 10e3/uL Final    Cholesterol 01/18/2023 207 (H)  <200 mg/dL Final    Triglycerides 01/18/2023 67  <150 mg/dL Final    Direct Measure HDL 01/18/2023 58  >=40 mg/dL Final    LDL Cholesterol Calculated 01/18/2023 136 (H)  <=100 mg/dL Final    Non HDL Cholesterol 01/18/2023 149 (H)  <130 mg/dL Final    Prostate Specific Antigen Screen 01/18/2023 0.94  0.00 - 3.50 ng/mL Final       The 10-year ASCVD risk score (Tasha BARRIENTOS, et al., 2019) is: 2.7%    Values used to calculate the score:      Age: 53 years      Sex: Male      Is Non- : No      Diabetic: No      Tobacco smoker: No      Systolic Blood Pressure: 112 mmHg      Is BP treated: No      HDL Cholesterol: 58 mg/dL      Total Cholesterol: 207 mg/dL      ECG Link    Patient Activity Monitor - Scan on 4/8/19 12:00 AM by HISTORICAL PROVIDER     Scans on Order [id number]    Scan on 4/8/2019 by Historical Provider        Conclusion    [facility]     MULTI-DAY PATIENT ACTIVATED MONITOR (DARYN) REPORT     Results:    Indication for study: Palpitations    The monitor was worn: A single-lead Patch monitor was worn from March 29, 2019 through April 4, 2019.  Compliance with the monitor was poor with only 44% of the recording interval having interpretable data.    The baseline rhythm transmission demonstrated sinus rhythm with heart rates in the 70s.  The OR interval, QRS duration and QT intervals were all normal.    The patient had 10 manually activated rhythm recordings.  Symptoms were reported for 8 of the 10 recordings.  Those symptoms included palpitations, rapid heart rate, and fluttering.  In all cases the patient's rhythm was sinus mechanism and/or sinus   tachycardia with heart rates ranging anywhere from  bpm.  4 of the 8 symptomatic recordings had sinus rhythm only without any  ectopy.  The other 4 recordings had rare isolated PVCs.  The 2 asymptomatic manual recordings demonstrated sinus rhythm   with heart rates ranging between 70 and 90 bpm and no ectopy.    The patient had no auto triggered recordings.       Impression:    Essentially normal multi-day patient activated monitor.    The patient recorded episodes of symptomatic palpitations. It is the reader's impression that the rare ectopy manifest on only half of the symptomatic recordings is the cause for the patient's symptoms.  No other pathologic rhythm disturbance was   demonstrated.    No sustained atrial or ventricular tachyarrhythmia    No profound bradycardia or pauses.    Recording quality is adequate.  Signed Electronically by: Real Castaneda MD

## 2024-10-30 DIAGNOSIS — E87.5 HYPERKALEMIA: Primary | ICD-10-CM

## 2024-10-30 PROBLEM — R73.03 PREDIABETES: Status: ACTIVE | Noted: 2024-10-30

## 2024-10-30 LAB
ANION GAP SERPL CALCULATED.3IONS-SCNC: 10 MMOL/L (ref 7–15)
ATRIAL RATE - MUSE: 69 BPM
BUN SERPL-MCNC: 10.6 MG/DL (ref 6–20)
CALCIUM SERPL-MCNC: 9.2 MG/DL (ref 8.8–10.4)
CHLORIDE SERPL-SCNC: 105 MMOL/L (ref 98–107)
CREAT SERPL-MCNC: 0.91 MG/DL (ref 0.67–1.17)
DIASTOLIC BLOOD PRESSURE - MUSE: NORMAL MMHG
EGFRCR SERPLBLD CKD-EPI 2021: >90 ML/MIN/1.73M2
GLUCOSE SERPL-MCNC: 103 MG/DL (ref 70–99)
HCO3 SERPL-SCNC: 26 MMOL/L (ref 22–29)
INTERPRETATION ECG - MUSE: NORMAL
P AXIS - MUSE: NORMAL DEGREES
POTASSIUM SERPL-SCNC: 5.5 MMOL/L (ref 3.4–5.3)
PR INTERVAL - MUSE: NORMAL MS
QRS DURATION - MUSE: 96 MS
QT - MUSE: 364 MS
QTC - MUSE: 390 MS
R AXIS - MUSE: 145 DEGREES
SODIUM SERPL-SCNC: 141 MMOL/L (ref 135–145)
SYSTOLIC BLOOD PRESSURE - MUSE: NORMAL MMHG
T AXIS - MUSE: 143 DEGREES
TSH SERPL DL<=0.005 MIU/L-ACNC: 1.07 UIU/ML (ref 0.3–4.2)
VENTRICULAR RATE- MUSE: 69 BPM

## 2024-10-31 ENCOUNTER — MYC MEDICAL ADVICE (OUTPATIENT)
Dept: FAMILY MEDICINE | Facility: CLINIC | Age: 54
End: 2024-10-31
Payer: COMMERCIAL

## 2024-11-05 ENCOUNTER — LAB (OUTPATIENT)
Dept: LAB | Facility: CLINIC | Age: 54
End: 2024-11-05
Payer: COMMERCIAL

## 2024-11-05 DIAGNOSIS — E87.5 HYPERKALEMIA: ICD-10-CM

## 2024-11-05 LAB — POTASSIUM SERPL-SCNC: 4.7 MMOL/L (ref 3.4–5.3)

## 2024-11-05 PROCEDURE — 36415 COLL VENOUS BLD VENIPUNCTURE: CPT

## 2024-11-05 PROCEDURE — 84132 ASSAY OF SERUM POTASSIUM: CPT

## 2024-11-08 PROCEDURE — 93244 EXT ECG>48HR<7D REV&INTERPJ: CPT | Performed by: INTERNAL MEDICINE

## 2024-11-09 ENCOUNTER — MYC MEDICAL ADVICE (OUTPATIENT)
Dept: FAMILY MEDICINE | Facility: CLINIC | Age: 54
End: 2024-11-09
Payer: COMMERCIAL

## 2024-11-11 NOTE — TELEPHONE ENCOUNTER
Please advise.     Rosangela JOSEPH RN      LOV 10/29/24  Palpitations     Rod is a 53-year-old male presenting to discuss palpitations.  He reports that he has had palpitations in the past but it has been many years.  They occur randomly throughout the day and occur more frequently when he is pain attention or thinking about them.  Will be a couple of beats every minute but no more than 5 beats in a minute where he feels the palpitations.  It is not associated with chest pain or shortness of breath, but is frequently associated with a pulsing sensation in his left ear.  This will occur frequently throughout the day, 20-30 times on average.  Often will occur while he is walking although it is not reliably occurring when walking.

## 2024-11-12 NOTE — TELEPHONE ENCOUNTER
The palpitations or sensations you are getting are corresponding to premature contractions of the heart, which are occurring at a rate that is normal.  Some people are more sensitive to these premature contractions, but they are not harmful.  They can definitely be more common in people who experience anxiety or increasing levels of stress.  I agree with your understanding of the zio patch results.      Please relay above message

## 2024-11-15 ENCOUNTER — OFFICE VISIT (OUTPATIENT)
Dept: AUDIOLOGY | Facility: CLINIC | Age: 54
End: 2024-11-15
Payer: COMMERCIAL

## 2024-11-15 DIAGNOSIS — H93.13 TINNITUS OF BOTH EARS: Primary | ICD-10-CM

## 2024-11-15 NOTE — PROGRESS NOTES
AUDIOLOGY REPORT     SUMMARY: Audiology visit completed. See audiogram for results.       RECOMMENDATIONS: Follow-up with ENT.    Betzy Ash, CCC-A  [place] Licensed Audiologist #[id number]

## 2024-12-06 ENCOUNTER — OFFICE VISIT (OUTPATIENT)
Dept: OTOLARYNGOLOGY | Facility: CLINIC | Age: 54
End: 2024-12-06
Payer: COMMERCIAL

## 2024-12-06 DIAGNOSIS — H93.A2 PULSATILE TINNITUS, LEFT EAR: ICD-10-CM

## 2024-12-06 DIAGNOSIS — Z01.10 NORMAL HEARING EXAM: ICD-10-CM

## 2024-12-06 DIAGNOSIS — H61.22 IMPACTED CERUMEN OF LEFT EAR: ICD-10-CM

## 2024-12-06 DIAGNOSIS — H93.13 TINNITUS, BILATERAL: Primary | ICD-10-CM

## 2024-12-06 NOTE — PROGRESS NOTES
CHIEF COMPLAINT: Patient presents with:  Tinnitus: Pulsatile tinnitus, left ear. Per pt reports that he can hear his heartbeat in his let ear mostly when he is active. Unsure when symptoms started but he thinks it has  been going on for years. Also he makes a lot of wax. No noise exposure.          HISTORY OF PRESENT ILLNESS    Rod was seen at the behest of Real Castaneda MD for tinnitus.     AUDIOLOGY NOTE:    HISTORY: Last audiogram in 2017 showed normal hearing bilaterally. Patient reports having a non-bothersome ringing tinnitus in both ears that began 25 years ago. He reports noticing a non-bothersome throbbing/heart beat sound mostly in the left ear as well. He feels there is a connection with his tinnitus and anxiety. He reports having positional vertigo for his whole life with the most recent episode occurring about a year ago. He reports being treated for BPPV in the past and when his dizziness returns he will do exercises which help. He denies hearing loss, otalgia, otorrhea, aural fullness, past ear surgery, family history of hearing loss, noise exposure, and prior use of amplification. RESULTS: Otoscopy: moderate cerumen in both ears which was completely removed from right ear and partially removed from left ear without incident via lighted curette prior to testing today. Could not safely remove all wax from left ear today. Tymps: Type A bilaterally. Reflexes: Present right ipsi, elevated left ipsi; CNT contra due to equipment limitations. Audio: normal hearing bilaterally. REC: F/U with ENT         REVIEW OF SYSTEMS    Review of Systems as per HPI and PMHx, otherwise 10 system review system are negative.       ALLERGIES    Patient has no known allergies.    CURRENT MEDICATIONS      Current Outpatient Medications:     carboxymethylcellulose (REFRESH PLUS) 0.5 % Dpet ophthalmic dropperette, [CARBOXYMETHYLCELLULOSE (REFRESH PLUS) 0.5 % DPET OPHTHALMIC DROPPERETTE] , Disp: , Rfl:     mometasone (ELOCON)  0.1 % cream, Apply topically as needed., Disp: , Rfl:      PAST MEDICAL HISTORY    PAST MEDICAL HISTORY:   Past Medical History:   Diagnosis Date    Anxiety     Vitamin D deficiency     On vitamin D supplement       PAST SURGICAL HISTORY    PAST SURGICAL HISTORY: No past surgical history on file.    FAMILY  HISTORY    FAMILY HISTORY:   Family History   Problem Relation Age of Onset    Breast Cancer Mother     Colon Cancer Father     Heart Disease Father     Diabetes Father     Snoring Father     Diabetes Brother        SOCIAL HISTORY    SOCIAL HISTORY:   Social History     Tobacco Use    Smoking status: Never     Passive exposure: Never    Smokeless tobacco: Never   Substance Use Topics    Alcohol use: Yes     Alcohol/week: 1.0 standard drink of alcohol     Types: 1 Standard drinks or equivalent per week     Comment: 1 / week        PHYSICAL EXAM    HEAD: Normal appearance and symmetry:  No cutaneous lesions.      NECK:  supple; auscultation of LEFT neck negative for bruits     EARS:    Right:   TM intact nl   LEFT:   TM intact nl      CERUMEN IMPACTION REMOVAL    After obtaining verbal consent, and using the binocular microscope.  Cerumen impaction(s) were removed from the affected ear canal(s)  using a wire loops and/or suction.  The patient tolerated the procedure well without incident.      EYES:  EOMI    CN VII/XII:  intact     NOSE:     Dorsum:   straight  Septum:  midline  Mucosa:  moist        ORAL CAVITY/OROPHARYNX:     Lips:  Normal.  Tongue: normal, midline  Mucosa:   no lesions     NECK:  Trachea:  midline.              Thyroid:  normal              Adenopathy:  none        NEURO:   Alert and Oriented     GAIT AND STATION:  normal     RESPIRATORY:   Symmetry and Respiratory effort     PSYCH:  Normal mood and affect     SKIN:   warm and dry     AUDIOGRAM: wnl       IMPRESSION:    Encounter Diagnoses   Name Primary?    Pulsatile tinnitus, left ear     Tinnitus, bilateral Yes    Normal hearing exam         RECOMMENDATIONS:      Orders Placed This Encounter   Procedures    MRA Brain ([facility]) wo & w Contrast      Results via [facility]  Return as needed

## 2024-12-06 NOTE — LETTER
12/6/2024      Sb Shultz  [address] [address]      Dear Colleague,    Thank you for referring your patient, Sb Shultz, to the [facility]. Please see a copy of my visit note below.    CHIEF COMPLAINT: Patient presents with:  Tinnitus: Pulsatile tinnitus, left ear. Per pt reports that he can hear his heartbeat in his let ear mostly when he is active. Unsure when symptoms started but he thinks it has  been going on for years. Also he makes a lot of wax. No noise exposure.          HISTORY OF PRESENT ILLNESS    Rod was seen at the behest of Real Castaneda MD for tinnitus.     AUDIOLOGY NOTE:    HISTORY: Last audiogram in 2017 showed normal hearing bilaterally. Patient reports having a non-bothersome ringing tinnitus in both ears that began 25 years ago. He reports noticing a non-bothersome throbbing/heart beat sound mostly in the left ear as well. He feels there is a connection with his tinnitus and anxiety. He reports having positional vertigo for his whole life with the most recent episode occurring about a year ago. He reports being treated for BPPV in the past and when his dizziness returns he will do exercises which help. He denies hearing loss, otalgia, otorrhea, aural fullness, past ear surgery, family history of hearing loss, noise exposure, and prior use of amplification. RESULTS: Otoscopy: moderate cerumen in both ears which was completely removed from right ear and partially removed from left ear without incident via lighted curette prior to testing today. Could not safely remove all wax from left ear today. Tymps: Type A bilaterally. Reflexes: Present right ipsi, elevated left ipsi; CNT contra due to equipment limitations. Audio: normal hearing bilaterally. REC: F/U with ENT         REVIEW OF SYSTEMS    Review of Systems as per HPI and PMHx, otherwise 10 system review system are negative.       ALLERGIES    Patient has no known allergies.    CURRENT  MEDICATIONS      Current Outpatient Medications:      carboxymethylcellulose (REFRESH PLUS) 0.5 % Dpet ophthalmic dropperette, [CARBOXYMETHYLCELLULOSE (REFRESH PLUS) 0.5 % DPET OPHTHALMIC DROPPERETTE] , Disp: , Rfl:      mometasone (ELOCON) 0.1 % cream, Apply topically as needed., Disp: , Rfl:      PAST MEDICAL HISTORY    PAST MEDICAL HISTORY:   Past Medical History:   Diagnosis Date     Anxiety      Vitamin D deficiency     On vitamin D supplement       PAST SURGICAL HISTORY    PAST SURGICAL HISTORY: No past surgical history on file.    FAMILY  HISTORY    FAMILY HISTORY:   Family History   Problem Relation Age of Onset     Breast Cancer Mother      Colon Cancer Father      Heart Disease Father      Diabetes Father      Snoring Father      Diabetes Brother        SOCIAL HISTORY    SOCIAL HISTORY:   Social History     Tobacco Use     Smoking status: Never     Passive exposure: Never     Smokeless tobacco: Never   Substance Use Topics     Alcohol use: Yes     Alcohol/week: 1.0 standard drink of alcohol     Types: 1 Standard drinks or equivalent per week     Comment: 1 / week        PHYSICAL EXAM    HEAD: Normal appearance and symmetry:  No cutaneous lesions.      NECK:  supple; auscultation of LEFT neck negative for bruits     EARS:    Right:   TM intact nl   LEFT:   TM intact nl      CERUMEN IMPACTION REMOVAL    After obtaining verbal consent, and using the binocular microscope.  Cerumen impaction(s) were removed from the affected ear canal(s)  using a wire loops and/or suction.  The patient tolerated the procedure well without incident.      EYES:  EOMI    CN VII/XII:  intact     NOSE:     Dorsum:   straight  Septum:  midline  Mucosa:  moist        ORAL CAVITY/OROPHARYNX:     Lips:  Normal.  Tongue: normal, midline  Mucosa:   no lesions     NECK:  Trachea:  midline.              Thyroid:  normal              Adenopathy:  none        NEURO:   Alert and Oriented     GAIT AND STATION:  normal     RESPIRATORY:    Symmetry and Respiratory effort     PSYCH:  Normal mood and affect     SKIN:   warm and dry     AUDIOGRAM: wnl       IMPRESSION:    Encounter Diagnoses   Name Primary?     Pulsatile tinnitus, left ear      Tinnitus, bilateral Yes     Normal hearing exam        RECOMMENDATIONS:      Orders Placed This Encounter   Procedures     MRA Brain ([facility]) wo & w Contrast      Results via myChart  Return as needed        Again, thank you for allowing me to participate in the care of your patient.        Sincerely,        Ferny Lewis MD

## 2024-12-22 ENCOUNTER — HOSPITAL ENCOUNTER (OUTPATIENT)
Dept: MRI IMAGING | Facility: CLINIC | Age: 54
Discharge: HOME | End: 2024-12-22
Attending: OTOLARYNGOLOGY | Admitting: OTOLARYNGOLOGY
Payer: COMMERCIAL

## 2024-12-22 DIAGNOSIS — H93.A2 PULSATILE TINNITUS, LEFT EAR: ICD-10-CM

## 2024-12-22 PROCEDURE — 70544 MR ANGIOGRAPHY HEAD W/O DYE: CPT

## 2024-12-30 ENCOUNTER — PATIENT OUTREACH (OUTPATIENT)
Dept: CARE COORDINATION | Facility: CLINIC | Age: 54
End: 2024-12-30
Payer: COMMERCIAL

## 2025-01-02 DIAGNOSIS — H93.13 TINNITUS, BILATERAL: ICD-10-CM

## 2025-01-02 DIAGNOSIS — H93.A2 PULSATILE TINNITUS, LEFT EAR: Primary | ICD-10-CM

## 2025-01-13 ENCOUNTER — PATIENT OUTREACH (OUTPATIENT)
Dept: CARE COORDINATION | Facility: CLINIC | Age: 55
End: 2025-01-13
Payer: COMMERCIAL

## 2025-01-26 ENCOUNTER — HOSPITAL ENCOUNTER (OUTPATIENT)
Dept: CT IMAGING | Facility: CLINIC | Age: 55
Discharge: HOME | End: 2025-01-26
Attending: OTOLARYNGOLOGY | Admitting: OTOLARYNGOLOGY
Payer: COMMERCIAL

## 2025-01-26 DIAGNOSIS — H93.A2 PULSATILE TINNITUS, LEFT EAR: ICD-10-CM

## 2025-01-26 DIAGNOSIS — H93.13 TINNITUS, BILATERAL: ICD-10-CM

## 2025-01-26 PROCEDURE — 70480 CT ORBIT/EAR/FOSSA W/O DYE: CPT

## 2025-01-29 ENCOUNTER — MYC MEDICAL ADVICE (OUTPATIENT)
Dept: OTOLARYNGOLOGY | Facility: CLINIC | Age: 55
End: 2025-01-29
Payer: COMMERCIAL

## 2025-01-29 DIAGNOSIS — H93.A2 PULSATILE TINNITUS, LEFT EAR: Primary | ICD-10-CM

## 2025-01-30 DIAGNOSIS — H93.A2 PULSATILE TINNITUS, LEFT EAR: Primary | ICD-10-CM

## 2025-01-31 NOTE — TELEPHONE ENCOUNTER
Alexander Velasco,    Dr. Lewis sent your message on to me, I will try to answer your question as best I can.  A MRV or MRI looking at the venous system in your brain, can help us to rule out some causes of pulsatile or pulsing tinnitus, that we could not see with an MRI.  Generally of venous causes suspected 1 there is a low home, especially if it is changing with activity or change in position of the body or head.  This could be present if there is increased pressure in your brain    The MRA that you had done is not able to see problems with the veins that could cause tinnitus, and these can still be important causes that need treatment.    An MRV usually takes around 25 to 60 minutes to complete.    If you would be interested in looking at this, and ruling out other causes of ringing in the ear, I will order this for you.  I would very much recommend it if you are having a low humming noise as part of your ear ringing especially if it goes away with change in body position or activity.    Please let me know if there are any questions or concerns,    Real Castaneda MD

## 2025-02-24 ENCOUNTER — HOSPITAL ENCOUNTER (OUTPATIENT)
Dept: MRI IMAGING | Facility: CLINIC | Age: 55
Discharge: HOME | End: 2025-02-24
Attending: OTOLARYNGOLOGY | Admitting: OTOLARYNGOLOGY
Payer: COMMERCIAL

## 2025-02-24 DIAGNOSIS — H93.A2 PULSATILE TINNITUS, LEFT EAR: ICD-10-CM

## 2025-02-24 PROCEDURE — A9585 GADOBUTROL INJECTION: HCPCS | Performed by: OTOLARYNGOLOGY

## 2025-02-24 PROCEDURE — 70546 MR ANGIOGRAPH HEAD W/O&W/DYE: CPT

## 2025-02-24 PROCEDURE — 255N000002 HC RX 255 OP 636: Performed by: OTOLARYNGOLOGY

## 2025-02-24 RX ORDER — GADOBUTROL 604.72 MG/ML
10 INJECTION INTRAVENOUS ONCE
Status: COMPLETED | OUTPATIENT
Start: 2025-02-24 | End: 2025-02-24

## 2025-02-24 RX ADMIN — GADOBUTROL 10 ML: 604.72 INJECTION INTRAVENOUS at 08:03

## 2025-04-16 SDOH — HEALTH STABILITY: PHYSICAL HEALTH: ON AVERAGE, HOW MANY MINUTES DO YOU ENGAGE IN EXERCISE AT THIS LEVEL?: 40 MIN

## 2025-04-16 SDOH — HEALTH STABILITY: PHYSICAL HEALTH: ON AVERAGE, HOW MANY DAYS PER WEEK DO YOU ENGAGE IN MODERATE TO STRENUOUS EXERCISE (LIKE A BRISK WALK)?: 4 DAYS

## 2025-04-16 ASSESSMENT — SOCIAL DETERMINANTS OF HEALTH (SDOH): HOW OFTEN DO YOU GET TOGETHER WITH FRIENDS OR RELATIVES?: TWICE A WEEK

## 2025-04-21 ENCOUNTER — OFFICE VISIT (OUTPATIENT)
Dept: FAMILY MEDICINE | Facility: CLINIC | Age: 55
End: 2025-04-21
Payer: COMMERCIAL

## 2025-04-21 VITALS
RESPIRATION RATE: 14 BRPM | OXYGEN SATURATION: 100 % | SYSTOLIC BLOOD PRESSURE: 110 MMHG | BODY MASS INDEX: 23.92 KG/M2 | HEIGHT: 76 IN | HEART RATE: 73 BPM | DIASTOLIC BLOOD PRESSURE: 80 MMHG | WEIGHT: 196.4 LBS | TEMPERATURE: 97.9 F

## 2025-04-21 DIAGNOSIS — M25.531 RIGHT WRIST PAIN: ICD-10-CM

## 2025-04-21 DIAGNOSIS — Z00.00 ROUTINE GENERAL MEDICAL EXAMINATION AT A HEALTH CARE FACILITY: Primary | ICD-10-CM

## 2025-04-21 DIAGNOSIS — E78.2 MIXED HYPERLIPIDEMIA: ICD-10-CM

## 2025-04-21 DIAGNOSIS — H93.A9 PULSATILE TINNITUS: ICD-10-CM

## 2025-04-21 PROBLEM — R73.03 PREDIABETES: Status: RESOLVED | Noted: 2024-10-30 | Resolved: 2025-04-21

## 2025-04-21 LAB
CHOLEST SERPL-MCNC: 221 MG/DL
FASTING STATUS PATIENT QL REPORTED: YES
HDLC SERPL-MCNC: 53 MG/DL
LDLC SERPL CALC-MCNC: 153 MG/DL
NONHDLC SERPL-MCNC: 168 MG/DL
TRIGL SERPL-MCNC: 76 MG/DL

## 2025-04-21 PROCEDURE — 36415 COLL VENOUS BLD VENIPUNCTURE: CPT | Performed by: STUDENT IN AN ORGANIZED HEALTH CARE EDUCATION/TRAINING PROGRAM

## 2025-04-21 PROCEDURE — 99213 OFFICE O/P EST LOW 20 MIN: CPT | Mod: 25 | Performed by: STUDENT IN AN ORGANIZED HEALTH CARE EDUCATION/TRAINING PROGRAM

## 2025-04-21 PROCEDURE — 99396 PREV VISIT EST AGE 40-64: CPT | Mod: 25 | Performed by: STUDENT IN AN ORGANIZED HEALTH CARE EDUCATION/TRAINING PROGRAM

## 2025-04-21 PROCEDURE — 80061 LIPID PANEL: CPT | Performed by: STUDENT IN AN ORGANIZED HEALTH CARE EDUCATION/TRAINING PROGRAM

## 2025-04-21 PROCEDURE — 90677 PCV20 VACCINE IM: CPT | Performed by: STUDENT IN AN ORGANIZED HEALTH CARE EDUCATION/TRAINING PROGRAM

## 2025-04-21 PROCEDURE — 90471 IMMUNIZATION ADMIN: CPT | Performed by: STUDENT IN AN ORGANIZED HEALTH CARE EDUCATION/TRAINING PROGRAM

## 2025-04-21 RX ORDER — FLUTICASONE PROPIONATE 50 MCG
SPRAY, SUSPENSION (ML) NASAL
COMMUNITY
Start: 2021-01-01

## 2025-04-21 NOTE — PROGRESS NOTES
Preventive Care Visit  [facility]  Real Castaneda MD, Family Medicine  Apr 21, 2025      Assessment & Plan     Routine general medical examination at a health care facility  Rod is a 54-year-old male presenting today for adult preventative exam.  He is doing well, only significant issue is wrist pain which is discussed further below.  Recommended for pneumococcal vaccination.  Recommended for repeat lipid panel, otherwise no other screening required at this time.  He reports understanding, we will continue to monitor for any other issues.    Mixed hyperlipidemia  Has had elevated lipid panels previously, last done in January 2023 recommended we repeat at this time to continue to monitor ASCVD risk.  Recommend for healthy diet and exercise.  He reports he is eating a healthy diet and exercising regularly.  Encouraged him to continue with this.  Is at a healthy BMI  - Lipid panel reflex to direct LDL Fasting  - Lipid panel reflex to direct LDL Fasting    Pulsatile tinnitus  This was evaluated by ENT, and myself.  No longer much of an issue anymore, although he still hears a pulsing in his left ear from time to time.  His MRA and MRI of the brain as well as CT temporal without contrast were normal.  No further workup required at this time.  Will monitor for worsening    Right wrist pain  Has had right wrist pain ever since doing outdoor work where he was pruning over the course of an entire day about 1 and half months ago.  Seems to be slightly improving with use of brace, rest and ice.  Did use Motrin every day for 7 days without much improvement.    Examination today is benign, no significant findings not tender, no pain at extension or flexion of wrists or with resisted flexion or extension or pronation or supination.    Clinical diagnosis, wrist tendinopathy.  Appears to have been from an overuse injury.  Recommend for home exercise program, he was agreeable and exercises were given to him.   Continue RICE treatment and use of ibuprofen and/or Voltaren gel as needed for pain.  May continue use of wrist brace which he already purchased on his own over-the-counter.  If he has no improvement after 1 to 2 months with this program he should notify me and we will refer him to occupational therapy and consider wrist ultrasound.        Counseling  Appropriate preventive services were addressed with this patient via screening, questionnaire, or discussion as appropriate for fall prevention, nutrition, physical activity, Tobacco-use cessation, social engagement, weight loss and cognition.  Checklist reviewing preventive services available has been given to the patient.  Reviewed patient's diet, addressing concerns and/or questions.           Ye Velasco is a 54 year old, presenting for the following:  Physical (Fasting. Tendon issue on right wrist, slightly painful when doing certain activities that requires for him to use his wrist. 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  He had no pain at the time but the next day had soreness to the ulnar side of the wrist.  3 days later played pickle ball and it started to hurt after the fourth game.  It started to hurt rather suddenly at that time.  He iced it and rested it and that seemed to help.  He still gets pain at the spot if he lifts something that is heavy , 5-10 pounds if he twists his wrist against resistance.  It is slightly improving, but not as quickly as he would like. He is right handed. Is wearing a wrist brace that is giving him some extra support and it does prevent the pain.  Did do 7 days of twice daily  ibuprofen and this did not help much.             Advance Care Planning    Discussed advance care planning with patient; informed AVS has link to [employer].        4/16/2025   General Health   How would you rate your overall physical health? Excellent   Feel stress (tense, anxious, or unable to sleep) Only a little   (!) STRESS CONCERN      4/16/2025   Nutrition   Three or more servings of calcium each day? (!) I DON'T KNOW   Diet: Other   If other, please elaborate: Mostly a Med Diet, emphasizing high quality local foods.   How many servings of fruit and vegetables per day? 4 or more   How many sweetened beverages each day? 0-1         4/16/2025   Exercise   Days per week of moderate/strenous exercise 4 days   Average minutes spent exercising at this level 40 min         4/16/2025   Social Factors   Frequency of gathering with friends or relatives Twice a week   Worry food won't last until get money to buy more No   Food not last or not have enough money for food? No   Do you have housing? (Housing is defined as stable permanent housing and does not include staying ouside in a car, in a tent, in an abandoned building, in an overnight shelter, or couch-surfing.) No   Are you worried about losing your housing? No   Lack of transportation? No   Unable to get utilities (heat,electricity)? No   Want help with housing or utility concern? No   (!) HOUSING CONCERN PRESENT      4/16/2025   Fall Risk   Fallen 2 or more times in the past year? No   Trouble with walking or balance? No          4/16/2025   Dental   Dentist two times every year? Yes         Today's PHQ-2 Score:       4/21/2025     7:44 AM   PHQ-2 ( 1999 [employer])   Q1: Little interest or pleasure in doing things 0   Q2: Feeling down, depressed or hopeless 0   PHQ-2 Score 0    Q1: Little interest or pleasure in doing things Not at all   Q2: Feeling down, depressed or hopeless Not at all   PHQ-2 Score 0       Patient-reported           4/16/2025   Substance  Use   Alcohol more than 3/day or more than 7/wk No   Do you use any other substances recreationally? No     Social History     Tobacco Use    Smoking status: Never     Passive exposure: Never    Smokeless tobacco: Never   Vaping Use    Vaping status: Never Used   Substance Use Topics    Alcohol use: Yes     Alcohol/week: 1.0 standard drink of alcohol     Types: 1 Standard drinks or equivalent per week     Comment: 1 / week    Drug use: Never           4/16/2025   STI Screening   New sexual partner(s) since last STI/HIV test? No   ASCVD Risk   The 10-year ASCVD risk score (Tasha BARRIENTOS, et al., 2019) is: 2.9%    Values used to calculate the score:      Age: 54 years      Sex: Male      Is Non- : No      Diabetic: No      Tobacco smoker: No      Systolic Blood Pressure: 110 mmHg      Is BP treated: No      HDL Cholesterol: 58 mg/dL      Total Cholesterol: 207 mg/dL           Reviewed and updated as needed this visit by Provider   Tobacco  Allergies  Meds  Problems  Med Hx  Surg Hx  Fam Hx     Sexual Activity          Past Medical History:   Diagnosis Date    Anxiety     Vitamin D deficiency     On vitamin D supplement     History reviewed. No pertinent surgical history.  Labs reviewed in EPIC  BP Readings from Last 3 Encounters:   04/21/25 110/80   10/29/24 112/78   06/27/24 118/77    Wt Readings from Last 3 Encounters:   04/21/25 89.1 kg (196 lb 6.4 oz)   10/29/24 88 kg (194 lb)   06/27/24 87.1 kg (192 lb)                  Patient Active Problem List   Diagnosis    TMJ Pain    Generalized anxiety disorder    Ganglion cyst of right foot    Benign paroxysmal positional vertigo due to bilateral vestibular disorder    Dyshidrotic eczema    Right testicular pain    Chronic bilateral low back pain without sciatica    Healthcare maintenance    Prediabetes     History reviewed. No pertinent surgical history.    Social History     Tobacco Use    Smoking status: Never     Passive exposure:  "Never    Smokeless tobacco: Never   Substance Use Topics    Alcohol use: Yes     Alcohol/week: 1.0 standard drink of alcohol     Types: 1 Standard drinks or equivalent per week     Comment: 1 / week     Family History   Problem Relation Age of Onset    Breast Cancer Mother     Colon Cancer Father     Heart Disease Father     Diabetes Father     Snoring Father     Coronary Artery Disease Father     Diabetes Brother          Current Outpatient Medications   Medication Sig Dispense Refill    fluticasone (FLONASE) 50 MCG/ACT nasal spray       mometasone (ELOCON) 0.1 % cream Apply topically as needed.      carboxymethylcellulose (REFRESH PLUS) 0.5 % Dpet ophthalmic dropperette [CARBOXYMETHYLCELLULOSE (REFRESH PLUS) 0.5 % DPET OPHTHALMIC DROPPERETTE]  (Patient not taking: Reported on 4/21/2025)       No Known Allergies  Recent Labs   Lab Test 11/05/24  0928 10/29/24  1035 01/18/23  0813 12/31/21  0746 12/31/21  0746 08/03/20  0834 08/03/20  0834 06/03/19  0900 05/16/18  0929   LDL  --   --  136*  --  130*  --  136*   < > 128   HDL  --   --  58  --  60  --  58   < > 53   TRIG  --   --  67  --  66  --  83   < > 64   ALT  --   --  26  --  41  --   --   --  51*   CR  --  0.91 0.87  --  0.93  --  0.99  --  0.91   GFRESTIMATED  --  >90 >90  --  >90   < > >60  --  >60   GFRESTBLACK  --   --   --   --   --   --  >60  --  >60   POTASSIUM 4.7 5.5* 4.2   < > 4.2  --  4.5  --  4.3   TSH  --  1.07  --   --   --   --   --   --   --     < > = values in this interval not displayed.          Review of Systems  Constitutional, neuro, ENT, endocrine, pulmonary, cardiac, gastrointestinal, genitourinary, musculoskeletal, integument and psychiatric systems are negative, except as otherwise noted.     Objective    Exam  /80 (BP Location: Right arm, Patient Position: Sitting, Cuff Size: Adult Regular)   Pulse 73   Temp 97.9  F (36.6  C)   Resp 14   Ht 1.925 m (6' 3.79\")   Wt 89.1 kg (196 lb 6.4 oz)   SpO2 100%   BMI 24.04 kg/m   " "  Estimated body mass index is 24.04 kg/m  as calculated from the following:    Height as of this encounter: 1.925 m (6' 3.79\").    Weight as of this encounter: 89.1 kg (196 lb 6.4 oz).    Physical Exam  GENERAL: alert and no distress  EYES: Eyes grossly normal to inspection, PERRL and conjunctivae and sclerae normal  HENT: ear canals and TM's normal, nose and mouth without ulcers or lesions  NECK: no adenopathy, no asymmetry, masses, or scars  RESP: lungs clear to auscultation - no rales, rhonchi or wheezes  CV: regular rate and rhythm, normal S1 S2, no S3 or S4, no murmur, click or rub, no peripheral edema  ABDOMEN: soft, nontender, no hepatosplenomegaly, no masses and bowel sounds normal  MS: Wrist: Right wrist without tenderness.  Full range of motion in extension and flexion.  No pain with resisted flexion, extension pronation or supination.  5 out of 5 strength.  No gross musculoskeletal defects noted, no edema  SKIN: no suspicious lesions or rashes  NEURO: Normal strength and tone, mentation intact and speech normal  PSYCH: mentation appears normal, affect normal/bright        Signed Electronically by: Real Castaneda MD    "

## 2025-04-21 NOTE — PATIENT INSTRUCTIONS
Patient Education   Preventive Care Advice   This is general advice given by our system to help you stay healthy. However, your care team may have specific advice just for you. Please talk to your care team about your preventive care needs.  Nutrition  Eat 5 or more servings of fruits and vegetables each day.  Try wheat bread, brown rice and whole grain pasta (instead of white bread, rice, and pasta).  Get enough calcium and vitamin D. Check the label on foods and aim for 100% of the RDA (recommended daily allowance).  Lifestyle  Exercise at least 150 minutes each week  (30 minutes a day, 5 days a week).  Do muscle strengthening activities 2 days a week. These help control your weight and prevent disease.  No smoking.  Wear sunscreen to prevent skin cancer.  Have a dental exam and cleaning every 6 months.  Yearly exams  See your health care team every year to talk about:  Any changes in your health.  Any medicines your care team has prescribed.  Preventive care, family planning, and ways to prevent chronic diseases.  Shots (vaccines)   HPV shots (up to age 26), if you've never had them before.  Hepatitis B shots (up to age 59), if you've never had them before.  COVID-19 shot: Get this shot when it's due.  Flu shot: Get a flu shot every year.  Tetanus shot: Get a tetanus shot every 10 years.  Pneumococcal, hepatitis A, and RSV shots: Ask your care team if you need these based on your risk.  Shingles shot (for age 50 and up)  General health tests  Diabetes screening:  Starting at age 35, Get screened for diabetes at least every 3 years.  If you are younger than age 35, ask your care team if you should be screened for diabetes.  Cholesterol test: At age 39, start having a cholesterol test every 5 years, or more often if advised.  Bone density scan (DEXA): At age 50, ask your care team if you should have this scan for osteoporosis (brittle bones).  Hepatitis C: Get tested at least once in your life.  STIs (sexually  transmitted infections)  Before age 24: Ask your care team if you should be screened for STIs.  After age 24: Get screened for STIs if you're at risk. You are at risk for STIs (including HIV) if:  You are sexually active with more than one person.  You don't use condoms every time.  You or a partner was diagnosed with a sexually transmitted infection.  If you are at risk for HIV, ask about PrEP medicine to prevent HIV.  Get tested for HIV at least once in your life, whether you are at risk for HIV or not.  Cancer screening tests  Cervical cancer screening: If you have a cervix, begin getting regular cervical cancer screening tests starting at age 21.  Breast cancer scan (mammogram): If you've ever had breasts, begin having regular mammograms starting at age 40. This is a scan to check for breast cancer.  Colon cancer screening: It is important to start screening for colon cancer at age 45.  Have a colonoscopy test every 10 years (or more often if you're at risk) Or, ask your provider about stool tests like a FIT test every year or Cologuard test every 3 years.  To learn more about your testing options, visit:   .  For help making a decision, visit:   https://bit.ly/lp14798.  Prostate cancer screening test: If you have a prostate, ask your care team if a prostate cancer screening test (PSA) at age 55 is right for you.  Lung cancer screening: If you are a current or former smoker ages 50 to 80, ask your care team if ongoing lung cancer screenings are right for you.  For informational purposes only. Not to replace the advice of your health care provider. Copyright   2023 [employer]. All rights reserved. Clinically reviewed by the [facility] Transitions Program. [employer] [id number] - REV 01/24.

## 2025-07-01 ENCOUNTER — TRANSFERRED RECORDS (OUTPATIENT)
Dept: HEALTH INFORMATION MANAGEMENT | Facility: CLINIC | Age: 55
End: 2025-07-01
Payer: COMMERCIAL

## 2025-08-12 ENCOUNTER — TRANSFERRED RECORDS (OUTPATIENT)
Dept: HEALTH INFORMATION MANAGEMENT | Facility: CLINIC | Age: 55
End: 2025-08-12
Payer: COMMERCIAL

## 2025-08-13 ENCOUNTER — E-VISIT (OUTPATIENT)
Dept: FAMILY MEDICINE | Facility: CLINIC | Age: 55
End: 2025-08-13
Payer: COMMERCIAL

## 2025-08-13 DIAGNOSIS — R45.82 WORRIES: Primary | ICD-10-CM

## 2025-08-13 PROCEDURE — 99207 PR NON-BILLABLE SERV PER CHARTING: CPT | Performed by: STUDENT IN AN ORGANIZED HEALTH CARE EDUCATION/TRAINING PROGRAM

## 2025-08-14 ENCOUNTER — PATIENT OUTREACH (OUTPATIENT)
Dept: CARE COORDINATION | Facility: CLINIC | Age: 55
End: 2025-08-14
Payer: COMMERCIAL

## 2025-08-28 ENCOUNTER — TRANSFERRED RECORDS (OUTPATIENT)
Dept: HEALTH INFORMATION MANAGEMENT | Facility: CLINIC | Age: 55
End: 2025-08-28
Payer: COMMERCIAL